# Patient Record
Sex: FEMALE | Race: WHITE | NOT HISPANIC OR LATINO | Employment: OTHER | URBAN - NONMETROPOLITAN AREA
[De-identification: names, ages, dates, MRNs, and addresses within clinical notes are randomized per-mention and may not be internally consistent; named-entity substitution may affect disease eponyms.]

---

## 2017-08-23 ENCOUNTER — OFFICE VISIT (OUTPATIENT)
Dept: GASTROENTEROLOGY | Facility: CLINIC | Age: 82
End: 2017-08-23

## 2017-08-23 VITALS
BODY MASS INDEX: 20.95 KG/M2 | HEART RATE: 84 BPM | HEIGHT: 64 IN | WEIGHT: 122.7 LBS | DIASTOLIC BLOOD PRESSURE: 69 MMHG | SYSTOLIC BLOOD PRESSURE: 126 MMHG

## 2017-08-23 DIAGNOSIS — D64.9 ANEMIA, UNSPECIFIED TYPE: Primary | ICD-10-CM

## 2017-08-23 DIAGNOSIS — R19.5 HEMATEST POSITIVE STOOLS: ICD-10-CM

## 2017-08-23 PROCEDURE — 99213 OFFICE O/P EST LOW 20 MIN: CPT | Performed by: NURSE PRACTITIONER

## 2017-08-23 RX ORDER — POTASSIUM CHLORIDE 20 MEQ/1
20 TABLET, EXTENDED RELEASE ORAL 2 TIMES DAILY
COMMUNITY
End: 2017-10-04 | Stop reason: ALTCHOICE

## 2017-08-23 RX ORDER — INSULIN GLARGINE 100 [IU]/ML
5 INJECTION, SOLUTION SUBCUTANEOUS NIGHTLY
COMMUNITY

## 2017-08-23 RX ORDER — FUROSEMIDE 40 MG/1
20 TABLET ORAL DAILY
COMMUNITY
Start: 2017-08-15 | End: 2017-12-22 | Stop reason: HOSPADM

## 2017-09-07 ENCOUNTER — APPOINTMENT (OUTPATIENT)
Dept: GENERAL RADIOLOGY | Age: 82
DRG: 871 | End: 2017-09-07
Payer: MEDICARE

## 2017-09-07 ENCOUNTER — APPOINTMENT (OUTPATIENT)
Dept: CT IMAGING | Age: 82
DRG: 871 | End: 2017-09-07
Payer: MEDICARE

## 2017-09-07 ENCOUNTER — HOSPITAL ENCOUNTER (INPATIENT)
Age: 82
LOS: 7 days | Discharge: HOME HEALTH | DRG: 871 | End: 2017-09-14
Attending: EMERGENCY MEDICINE | Admitting: FAMILY MEDICINE
Payer: MEDICARE

## 2017-09-07 DIAGNOSIS — R41.82 ALTERED MENTAL STATUS, UNSPECIFIED ALTERED MENTAL STATUS TYPE: Primary | ICD-10-CM

## 2017-09-07 DIAGNOSIS — I50.9 ACUTE ON CHRONIC CONGESTIVE HEART FAILURE, UNSPECIFIED CONGESTIVE HEART FAILURE TYPE: ICD-10-CM

## 2017-09-07 DIAGNOSIS — A41.9 SEPSIS, DUE TO UNSPECIFIED ORGANISM: ICD-10-CM

## 2017-09-07 DIAGNOSIS — E87.5 HYPERKALEMIA: ICD-10-CM

## 2017-09-07 DIAGNOSIS — J69.0 ASPIRATION PNEUMONIA OF RIGHT LUNG DUE TO GASTRIC SECRETIONS, UNSPECIFIED PART OF LUNG (HCC): ICD-10-CM

## 2017-09-07 DIAGNOSIS — R77.8 ELEVATED TROPONIN: ICD-10-CM

## 2017-09-07 DIAGNOSIS — I48.91 ATRIAL FIBRILLATION WITH RVR (HCC): ICD-10-CM

## 2017-09-07 DIAGNOSIS — N17.9 AKI (ACUTE KIDNEY INJURY) (HCC): ICD-10-CM

## 2017-09-07 PROBLEM — E11.9 TYPE 2 DIABETES MELLITUS WITHOUT COMPLICATION (HCC): Status: ACTIVE | Noted: 2017-09-07

## 2017-09-07 PROBLEM — I50.43 ACUTE ON CHRONIC COMBINED SYSTOLIC AND DIASTOLIC HEART FAILURE (HCC): Status: ACTIVE | Noted: 2017-09-07

## 2017-09-07 PROBLEM — G30.1 LATE ONSET ALZHEIMER'S DISEASE WITHOUT BEHAVIORAL DISTURBANCE (HCC): Status: ACTIVE | Noted: 2017-09-07

## 2017-09-07 PROBLEM — F02.80 LATE ONSET ALZHEIMER'S DISEASE WITHOUT BEHAVIORAL DISTURBANCE (HCC): Status: ACTIVE | Noted: 2017-09-07

## 2017-09-07 PROBLEM — I21.02 ST ELEVATION MYOCARDIAL INFARCTION INVOLVING LEFT ANTERIOR DESCENDING (LAD) CORONARY ARTERY (HCC): Status: ACTIVE | Noted: 2017-09-07

## 2017-09-07 LAB
ALBUMIN SERPL-MCNC: 4 G/DL (ref 3.5–5.2)
ALP BLD-CCNC: 67 U/L (ref 35–104)
ALT SERPL-CCNC: 52 U/L (ref 5–33)
ANION GAP SERPL CALCULATED.3IONS-SCNC: 21 MMOL/L (ref 7–19)
ANION GAP SERPL CALCULATED.3IONS-SCNC: 21 MMOL/L (ref 7–19)
APTT: 73.5 SEC (ref 26–36.2)
AST SERPL-CCNC: 83 U/L (ref 5–32)
BACTERIA: NEGATIVE /HPF
BASE EXCESS ARTERIAL: 0.7 MMOL/L (ref -2–2)
BASOPHILS ABSOLUTE: 0 K/UL (ref 0–0.2)
BASOPHILS RELATIVE PERCENT: 0.2 % (ref 0–1)
BILIRUB SERPL-MCNC: 0.8 MG/DL (ref 0.2–1.2)
BILIRUBIN URINE: NEGATIVE
BLOOD, URINE: NEGATIVE
BUN BLDV-MCNC: 49 MG/DL (ref 8–23)
BUN BLDV-MCNC: 49 MG/DL (ref 8–23)
CALCIUM SERPL-MCNC: 10 MG/DL (ref 8.2–9.6)
CALCIUM SERPL-MCNC: 9.4 MG/DL (ref 8.2–9.6)
CARBOXYHEMOGLOBIN ARTERIAL: 2.1 % (ref 0–5)
CHLORIDE BLD-SCNC: 94 MMOL/L (ref 98–111)
CHLORIDE BLD-SCNC: 97 MMOL/L (ref 98–111)
CLARITY: CLEAR
CO2: 24 MMOL/L (ref 22–29)
CO2: 24 MMOL/L (ref 22–29)
COLOR: YELLOW
CREAT SERPL-MCNC: 1.4 MG/DL (ref 0.5–0.9)
CREAT SERPL-MCNC: 1.4 MG/DL (ref 0.5–0.9)
DIGOXIN LEVEL: 0.8 NG/ML (ref 0.6–1.2)
EOSINOPHILS ABSOLUTE: 0 K/UL (ref 0–0.6)
EOSINOPHILS RELATIVE PERCENT: 0 % (ref 0–5)
EPITHELIAL CELLS, UA: 0 /HPF (ref 0–5)
GFR NON-AFRICAN AMERICAN: 35
GFR NON-AFRICAN AMERICAN: 35
GLUCOSE BLD-MCNC: 222 MG/DL (ref 70–99)
GLUCOSE BLD-MCNC: 228 MG/DL (ref 70–99)
GLUCOSE BLD-MCNC: 242 MG/DL (ref 74–109)
GLUCOSE BLD-MCNC: 290 MG/DL
GLUCOSE BLD-MCNC: 290 MG/DL (ref 70–99)
GLUCOSE BLD-MCNC: 304 MG/DL (ref 74–109)
GLUCOSE URINE: NEGATIVE MG/DL
HCO3 ARTERIAL: 23.4 MMOL/L (ref 22–26)
HCT VFR BLD CALC: 34.7 % (ref 37–47)
HEMOGLOBIN, ART, EXTENDED: 9.8 G/DL (ref 12–16)
HEMOGLOBIN: 10.1 G/DL (ref 12–16)
HYALINE CASTS: 2 /HPF (ref 0–8)
INR BLD: 3.01 (ref 0.88–1.18)
KETONES, URINE: NEGATIVE MG/DL
LACTIC ACID: 4.2 MG/DL (ref 0.5–1.9)
LACTIC ACID: 7.7 MG/DL (ref 0.5–1.9)
LEUKOCYTE ESTERASE, URINE: NEGATIVE
LYMPHOCYTES ABSOLUTE: 1 K/UL (ref 1.1–4.5)
LYMPHOCYTES RELATIVE PERCENT: 9 % (ref 20–40)
MCH RBC QN AUTO: 24.5 PG (ref 27–31)
MCHC RBC AUTO-ENTMCNC: 29.1 G/DL (ref 33–37)
MCV RBC AUTO: 84 FL (ref 81–99)
METHEMOGLOBIN ARTERIAL: 1 %
MONOCYTES ABSOLUTE: 0.4 K/UL (ref 0–0.9)
MONOCYTES RELATIVE PERCENT: 3.6 % (ref 0–10)
NEUTROPHILS ABSOLUTE: 10 K/UL (ref 1.5–7.5)
NEUTROPHILS RELATIVE PERCENT: 86.4 % (ref 50–65)
NITRITE, URINE: NEGATIVE
O2 CONTENT ARTERIAL: 12.7 ML/DL
O2 SAT, ARTERIAL: 91.6 %
O2 THERAPY: ABNORMAL
PCO2 ARTERIAL: 30 MMHG (ref 35–45)
PDW BLD-RTO: 18 % (ref 11.5–14.5)
PERFORMED ON: ABNORMAL
PH ARTERIAL: 7.5 (ref 7.35–7.45)
PH UA: 5.5
PLATELET # BLD: 275 K/UL (ref 130–400)
PMV BLD AUTO: 12.5 FL (ref 9.4–12.3)
PO2 ARTERIAL: 57 MMHG (ref 80–100)
POC TROPONIN I: 0.15 NG/ML (ref 0–0.08)
POC TROPONIN I: 0.34 NG/ML (ref 0–0.08)
POTASSIUM SERPL-SCNC: 4.2 MMOL/L (ref 3.5–5)
POTASSIUM SERPL-SCNC: 5.9 MMOL/L (ref 3.5–5)
POTASSIUM, WHOLE BLOOD: 4.4
PRO-BNP: ABNORMAL PG/ML (ref 0–1800)
PROTEIN UA: 30 MG/DL
PROTHROMBIN TIME: 31.6 SEC (ref 12–14.6)
RBC # BLD: 4.13 M/UL (ref 4.2–5.4)
RBC UA: 4 /HPF (ref 0–4)
SODIUM BLD-SCNC: 139 MMOL/L (ref 136–145)
SODIUM BLD-SCNC: 142 MMOL/L (ref 136–145)
SPECIFIC GRAVITY UA: 1.02
TOTAL PROTEIN: 7.9 G/DL (ref 6.6–8.7)
TROPONIN: 0.2 NG/ML (ref 0–0.03)
TSH SERPL DL<=0.05 MIU/L-ACNC: 3.53 UIU/ML (ref 0.27–4.2)
UROBILINOGEN, URINE: 1 E.U./DL
WBC # BLD: 11.5 K/UL (ref 4.8–10.8)
WBC UA: 1 /HPF (ref 0–5)

## 2017-09-07 PROCEDURE — 85730 THROMBOPLASTIN TIME PARTIAL: CPT

## 2017-09-07 PROCEDURE — 2580000003 HC RX 258: Performed by: FAMILY MEDICINE

## 2017-09-07 PROCEDURE — 36556 INSERT NON-TUNNEL CV CATH: CPT | Performed by: FAMILY MEDICINE

## 2017-09-07 PROCEDURE — 6360000002 HC RX W HCPCS: Performed by: EMERGENCY MEDICINE

## 2017-09-07 PROCEDURE — 6370000000 HC RX 637 (ALT 250 FOR IP): Performed by: EMERGENCY MEDICINE

## 2017-09-07 PROCEDURE — 93005 ELECTROCARDIOGRAM TRACING: CPT

## 2017-09-07 PROCEDURE — 94640 AIRWAY INHALATION TREATMENT: CPT

## 2017-09-07 PROCEDURE — 72125 CT NECK SPINE W/O DYE: CPT

## 2017-09-07 PROCEDURE — 83605 ASSAY OF LACTIC ACID: CPT

## 2017-09-07 PROCEDURE — 80053 COMPREHEN METABOLIC PANEL: CPT

## 2017-09-07 PROCEDURE — 84132 ASSAY OF SERUM POTASSIUM: CPT

## 2017-09-07 PROCEDURE — 80162 ASSAY OF DIGOXIN TOTAL: CPT

## 2017-09-07 PROCEDURE — 84443 ASSAY THYROID STIM HORMONE: CPT

## 2017-09-07 PROCEDURE — 71010 XR CHEST PORTABLE: CPT

## 2017-09-07 PROCEDURE — 74176 CT ABD & PELVIS W/O CONTRAST: CPT

## 2017-09-07 PROCEDURE — 2500000003 HC RX 250 WO HCPCS: Performed by: EMERGENCY MEDICINE

## 2017-09-07 PROCEDURE — 99291 CRITICAL CARE FIRST HOUR: CPT | Performed by: FAMILY MEDICINE

## 2017-09-07 PROCEDURE — 82948 REAGENT STRIP/BLOOD GLUCOSE: CPT

## 2017-09-07 PROCEDURE — 70450 CT HEAD/BRAIN W/O DYE: CPT

## 2017-09-07 PROCEDURE — 6370000000 HC RX 637 (ALT 250 FOR IP): Performed by: HOSPITALIST

## 2017-09-07 PROCEDURE — 99291 CRITICAL CARE FIRST HOUR: CPT | Performed by: EMERGENCY MEDICINE

## 2017-09-07 PROCEDURE — 36600 WITHDRAWAL OF ARTERIAL BLOOD: CPT

## 2017-09-07 PROCEDURE — 84484 ASSAY OF TROPONIN QUANT: CPT

## 2017-09-07 PROCEDURE — 85025 COMPLETE CBC W/AUTO DIFF WBC: CPT

## 2017-09-07 PROCEDURE — 82803 BLOOD GASES ANY COMBINATION: CPT

## 2017-09-07 PROCEDURE — 6370000000 HC RX 637 (ALT 250 FOR IP): Performed by: FAMILY MEDICINE

## 2017-09-07 PROCEDURE — 2000000000 HC ICU R&B

## 2017-09-07 PROCEDURE — 87040 BLOOD CULTURE FOR BACTERIA: CPT

## 2017-09-07 PROCEDURE — 06HM33Z INSERTION OF INFUSION DEVICE INTO RIGHT FEMORAL VEIN, PERCUTANEOUS APPROACH: ICD-10-PCS | Performed by: FAMILY MEDICINE

## 2017-09-07 PROCEDURE — 83880 ASSAY OF NATRIURETIC PEPTIDE: CPT

## 2017-09-07 PROCEDURE — 81001 URINALYSIS AUTO W/SCOPE: CPT

## 2017-09-07 PROCEDURE — 2580000003 HC RX 258: Performed by: EMERGENCY MEDICINE

## 2017-09-07 PROCEDURE — 2700000000 HC OXYGEN THERAPY PER DAY

## 2017-09-07 PROCEDURE — 36415 COLL VENOUS BLD VENIPUNCTURE: CPT

## 2017-09-07 PROCEDURE — 36556 INSERT NON-TUNNEL CV CATH: CPT

## 2017-09-07 PROCEDURE — 99285 EMERGENCY DEPT VISIT HI MDM: CPT

## 2017-09-07 PROCEDURE — 85610 PROTHROMBIN TIME: CPT

## 2017-09-07 RX ORDER — DIGOXIN 125 MCG
125 TABLET ORAL EVERY OTHER DAY
COMMUNITY
End: 2018-01-05

## 2017-09-07 RX ORDER — MORPHINE SULFATE 4 MG/ML
1 INJECTION, SOLUTION INTRAMUSCULAR; INTRAVENOUS
Status: DISCONTINUED | OUTPATIENT
Start: 2017-09-07 | End: 2017-09-14 | Stop reason: HOSPADM

## 2017-09-07 RX ORDER — DEXTROSE MONOHYDRATE 50 MG/ML
100 INJECTION, SOLUTION INTRAVENOUS PRN
Status: DISCONTINUED | OUTPATIENT
Start: 2017-09-07 | End: 2017-09-14 | Stop reason: HOSPADM

## 2017-09-07 RX ORDER — ASPIRIN 300 MG/1
300 SUPPOSITORY RECTAL DAILY
Status: DISCONTINUED | OUTPATIENT
Start: 2017-09-07 | End: 2017-09-08

## 2017-09-07 RX ORDER — LISINOPRIL 10 MG/1
10 TABLET ORAL DAILY
COMMUNITY
End: 2018-01-05

## 2017-09-07 RX ORDER — DEXTROSE MONOHYDRATE 25 G/50ML
12.5 INJECTION, SOLUTION INTRAVENOUS PRN
Status: DISCONTINUED | OUTPATIENT
Start: 2017-09-07 | End: 2017-09-14 | Stop reason: HOSPADM

## 2017-09-07 RX ORDER — FUROSEMIDE 40 MG/1
40 TABLET ORAL EVERY OTHER DAY
Status: ON HOLD | COMMUNITY
End: 2017-09-14

## 2017-09-07 RX ORDER — SODIUM CHLORIDE 0.9 % (FLUSH) 0.9 %
10 SYRINGE (ML) INJECTION EVERY 12 HOURS SCHEDULED
Status: DISCONTINUED | OUTPATIENT
Start: 2017-09-07 | End: 2017-09-14 | Stop reason: HOSPADM

## 2017-09-07 RX ORDER — INSULIN GLARGINE 100 [IU]/ML
INJECTION, SOLUTION SUBCUTANEOUS NIGHTLY
Status: ON HOLD | COMMUNITY
End: 2017-09-14

## 2017-09-07 RX ORDER — SODIUM CHLORIDE 9 MG/ML
INJECTION, SOLUTION INTRAVENOUS CONTINUOUS
Status: DISCONTINUED | OUTPATIENT
Start: 2017-09-07 | End: 2017-09-12

## 2017-09-07 RX ORDER — PANTOPRAZOLE SODIUM 40 MG/1
40 GRANULE, DELAYED RELEASE ORAL
COMMUNITY

## 2017-09-07 RX ORDER — ACETAMINOPHEN 325 MG/1
650 TABLET ORAL EVERY 4 HOURS PRN
Status: DISCONTINUED | OUTPATIENT
Start: 2017-09-07 | End: 2017-09-14 | Stop reason: HOSPADM

## 2017-09-07 RX ORDER — VANCOMYCIN HYDROCHLORIDE 1 G/200ML
1000 INJECTION, SOLUTION INTRAVENOUS ONCE
Status: COMPLETED | OUTPATIENT
Start: 2017-09-07 | End: 2017-09-07

## 2017-09-07 RX ORDER — LEVOFLOXACIN 5 MG/ML
250 INJECTION, SOLUTION INTRAVENOUS EVERY 24 HOURS
Status: DISCONTINUED | OUTPATIENT
Start: 2017-09-08 | End: 2017-09-10

## 2017-09-07 RX ORDER — FUROSEMIDE 10 MG/ML
40 INJECTION INTRAMUSCULAR; INTRAVENOUS ONCE
Status: COMPLETED | OUTPATIENT
Start: 2017-09-07 | End: 2017-09-07

## 2017-09-07 RX ORDER — ATORVASTATIN CALCIUM 40 MG/1
40 TABLET, FILM COATED ORAL DAILY
COMMUNITY

## 2017-09-07 RX ORDER — IPRATROPIUM BROMIDE AND ALBUTEROL SULFATE 2.5; .5 MG/3ML; MG/3ML
1 SOLUTION RESPIRATORY (INHALATION) ONCE
Status: COMPLETED | OUTPATIENT
Start: 2017-09-07 | End: 2017-09-07

## 2017-09-07 RX ORDER — SODIUM POLYSTYRENE SULFONATE 15 G/60ML
15 SUSPENSION ORAL; RECTAL ONCE
Status: COMPLETED | OUTPATIENT
Start: 2017-09-07 | End: 2017-09-07

## 2017-09-07 RX ORDER — LEVOTHYROXINE SODIUM 0.03 MG/1
25 TABLET ORAL 2 TIMES DAILY
COMMUNITY

## 2017-09-07 RX ORDER — MORPHINE SULFATE 4 MG/ML
1 INJECTION, SOLUTION INTRAMUSCULAR; INTRAVENOUS
Status: DISCONTINUED | OUTPATIENT
Start: 2017-09-07 | End: 2017-09-07 | Stop reason: SDUPTHER

## 2017-09-07 RX ORDER — SODIUM CHLORIDE 0.9 % (FLUSH) 0.9 %
10 SYRINGE (ML) INJECTION PRN
Status: DISCONTINUED | OUTPATIENT
Start: 2017-09-07 | End: 2017-09-14 | Stop reason: HOSPADM

## 2017-09-07 RX ORDER — NICOTINE POLACRILEX 4 MG
15 LOZENGE BUCCAL PRN
Status: DISCONTINUED | OUTPATIENT
Start: 2017-09-07 | End: 2017-09-14 | Stop reason: HOSPADM

## 2017-09-07 RX ORDER — ONDANSETRON 2 MG/ML
4 INJECTION INTRAMUSCULAR; INTRAVENOUS EVERY 6 HOURS PRN
Status: DISCONTINUED | OUTPATIENT
Start: 2017-09-07 | End: 2017-09-14 | Stop reason: HOSPADM

## 2017-09-07 RX ORDER — CARVEDILOL 12.5 MG/1
12.5 TABLET ORAL 2 TIMES DAILY
Status: ON HOLD | COMMUNITY
End: 2017-09-14

## 2017-09-07 RX ORDER — LEVOFLOXACIN 5 MG/ML
750 INJECTION, SOLUTION INTRAVENOUS ONCE
Status: COMPLETED | OUTPATIENT
Start: 2017-09-07 | End: 2017-09-07

## 2017-09-07 RX ORDER — IPRATROPIUM BROMIDE AND ALBUTEROL SULFATE 2.5; .5 MG/3ML; MG/3ML
1 SOLUTION RESPIRATORY (INHALATION) EVERY 4 HOURS
Status: DISCONTINUED | OUTPATIENT
Start: 2017-09-07 | End: 2017-09-14 | Stop reason: HOSPADM

## 2017-09-07 RX ORDER — MORPHINE SULFATE 4 MG/ML
2 INJECTION, SOLUTION INTRAMUSCULAR; INTRAVENOUS
Status: DISCONTINUED | OUTPATIENT
Start: 2017-09-07 | End: 2017-09-07 | Stop reason: SDUPTHER

## 2017-09-07 RX ORDER — POTASSIUM CHLORIDE 1.5 G/1.77G
20 POWDER, FOR SOLUTION ORAL DAILY
Status: ON HOLD | COMMUNITY
End: 2017-09-14 | Stop reason: HOSPADM

## 2017-09-07 RX ORDER — SODIUM CHLORIDE 9 MG/ML
INJECTION, SOLUTION INTRAVENOUS CONTINUOUS
Status: DISCONTINUED | OUTPATIENT
Start: 2017-09-07 | End: 2017-09-07 | Stop reason: SDUPTHER

## 2017-09-07 RX ORDER — CALCIUM CHLORIDE 100 MG/ML
1 INJECTION INTRAVENOUS; INTRAVENTRICULAR ONCE
Status: COMPLETED | OUTPATIENT
Start: 2017-09-07 | End: 2017-09-07

## 2017-09-07 RX ORDER — INDAPAMIDE 1.25 MG/1
1.25 TABLET, FILM COATED ORAL DAILY
Status: ON HOLD | COMMUNITY
End: 2017-09-14 | Stop reason: HOSPADM

## 2017-09-07 RX ORDER — MORPHINE SULFATE 4 MG/ML
2 INJECTION, SOLUTION INTRAMUSCULAR; INTRAVENOUS
Status: DISCONTINUED | OUTPATIENT
Start: 2017-09-07 | End: 2017-09-14 | Stop reason: HOSPADM

## 2017-09-07 RX ORDER — DEXTROSE MONOHYDRATE 25 G/50ML
25 INJECTION, SOLUTION INTRAVENOUS ONCE
Status: COMPLETED | OUTPATIENT
Start: 2017-09-07 | End: 2017-09-07

## 2017-09-07 RX ORDER — DABIGATRAN ETEXILATE 75 MG/1
75 CAPSULE, COATED PELLETS ORAL 2 TIMES DAILY
COMMUNITY
End: 2018-01-05

## 2017-09-07 RX ORDER — INSULIN GLARGINE 100 [IU]/ML
5 INJECTION, SOLUTION SUBCUTANEOUS NIGHTLY
Status: DISCONTINUED | OUTPATIENT
Start: 2017-09-07 | End: 2017-09-14 | Stop reason: HOSPADM

## 2017-09-07 RX ADMIN — INSULIN LISPRO 2 UNITS: 100 INJECTION, SOLUTION INTRAVENOUS; SUBCUTANEOUS at 21:11

## 2017-09-07 RX ADMIN — INSULIN LISPRO 4 UNITS: 100 INJECTION, SOLUTION INTRAVENOUS; SUBCUTANEOUS at 18:12

## 2017-09-07 RX ADMIN — IPRATROPIUM BROMIDE AND ALBUTEROL SULFATE 1 AMPULE: .5; 3 SOLUTION RESPIRATORY (INHALATION) at 18:37

## 2017-09-07 RX ADMIN — INSULIN GLARGINE 5 UNITS: 100 INJECTION, SOLUTION SUBCUTANEOUS at 21:10

## 2017-09-07 RX ADMIN — ASPIRIN 300 MG: 300 SUPPOSITORY RECTAL at 17:38

## 2017-09-07 RX ADMIN — FUROSEMIDE 40 MG: 10 INJECTION, SOLUTION INTRAMUSCULAR; INTRAVENOUS at 14:11

## 2017-09-07 RX ADMIN — TAZOBACTAM SODIUM AND PIPERACILLIN SODIUM 3.38 G: 375; 3 INJECTION, SOLUTION INTRAVENOUS at 14:27

## 2017-09-07 RX ADMIN — IPRATROPIUM BROMIDE AND ALBUTEROL SULFATE 1 AMPULE: .5; 3 SOLUTION RESPIRATORY (INHALATION) at 22:24

## 2017-09-07 RX ADMIN — SODIUM POLYSTYRENE SULFONATE 15 G: 15 SUSPENSION ORAL; RECTAL at 17:56

## 2017-09-07 RX ADMIN — DEXTROSE MONOHYDRATE 25 G: 25 INJECTION, SOLUTION INTRAVENOUS at 13:43

## 2017-09-07 RX ADMIN — LEVOFLOXACIN 750 MG: 5 INJECTION, SOLUTION INTRAVENOUS at 14:48

## 2017-09-07 RX ADMIN — Medication 10 ML: at 21:07

## 2017-09-07 RX ADMIN — INSULIN HUMAN 10 UNITS: 100 INJECTION, SOLUTION PARENTERAL at 14:05

## 2017-09-07 RX ADMIN — VANCOMYCIN HYDROCHLORIDE 1000 MG: 1 INJECTION, SOLUTION INTRAVENOUS at 17:03

## 2017-09-07 RX ADMIN — IPRATROPIUM BROMIDE AND ALBUTEROL SULFATE 1 AMPULE: .5; 3 SOLUTION RESPIRATORY (INHALATION) at 13:26

## 2017-09-07 RX ADMIN — CALCIUM CHLORIDE 1 G: 100 INJECTION, SOLUTION INTRAVENOUS at 14:04

## 2017-09-07 ASSESSMENT — PAIN SCALES - WONG BAKER
WONGBAKER_NUMERICALRESPONSE: 0
WONGBAKER_NUMERICALRESPONSE: 0

## 2017-09-08 LAB
ANION GAP SERPL CALCULATED.3IONS-SCNC: 17 MMOL/L (ref 7–19)
ANION GAP SERPL CALCULATED.3IONS-SCNC: 19 MMOL/L (ref 7–19)
BASOPHILS ABSOLUTE: 0 K/UL (ref 0–0.2)
BASOPHILS RELATIVE PERCENT: 0.1 % (ref 0–1)
BUN BLDV-MCNC: 54 MG/DL (ref 8–23)
BUN BLDV-MCNC: 57 MG/DL (ref 8–23)
CALCIUM SERPL-MCNC: 9 MG/DL (ref 8.2–9.6)
CALCIUM SERPL-MCNC: 9.8 MG/DL (ref 8.2–9.6)
CHLORIDE BLD-SCNC: 96 MMOL/L (ref 98–111)
CHLORIDE BLD-SCNC: 98 MMOL/L (ref 98–111)
CO2: 26 MMOL/L (ref 22–29)
CO2: 29 MMOL/L (ref 22–29)
CREAT SERPL-MCNC: 1.5 MG/DL (ref 0.5–0.9)
CREAT SERPL-MCNC: 1.6 MG/DL (ref 0.5–0.9)
EKG P AXIS: NORMAL DEGREES
EKG P-R INTERVAL: NORMAL MS
EKG Q-T INTERVAL: 368 MS
EKG QRS DURATION: 130 MS
EKG QTC CALCULATION (BAZETT): 457 MS
EKG T AXIS: -140 DEGREES
EOSINOPHILS ABSOLUTE: 0 K/UL (ref 0–0.6)
EOSINOPHILS RELATIVE PERCENT: 0 % (ref 0–5)
GFR NON-AFRICAN AMERICAN: 30
GFR NON-AFRICAN AMERICAN: 32
GLUCOSE BLD-MCNC: 139 MG/DL (ref 70–99)
GLUCOSE BLD-MCNC: 155 MG/DL (ref 70–99)
GLUCOSE BLD-MCNC: 158 MG/DL (ref 74–109)
GLUCOSE BLD-MCNC: 162 MG/DL (ref 70–99)
GLUCOSE BLD-MCNC: 166 MG/DL (ref 74–109)
GLUCOSE BLD-MCNC: 184 MG/DL (ref 70–99)
HCT VFR BLD CALC: 28.5 % (ref 37–47)
HEMOGLOBIN: 8.4 G/DL (ref 12–16)
LACTIC ACID: 1.7 MG/DL (ref 0.5–1.9)
LACTIC ACID: 3.8 MG/DL (ref 0.5–1.9)
LV EF: 13 %
LVEF MODALITY: NORMAL
LYMPHOCYTES ABSOLUTE: 1.6 K/UL (ref 1.1–4.5)
LYMPHOCYTES RELATIVE PERCENT: 9 % (ref 20–40)
MAGNESIUM: 1 MG/DL (ref 1.7–2.3)
MCH RBC QN AUTO: 24.9 PG (ref 27–31)
MCHC RBC AUTO-ENTMCNC: 29.5 G/DL (ref 33–37)
MCV RBC AUTO: 84.3 FL (ref 81–99)
MONOCYTES ABSOLUTE: 1 K/UL (ref 0–0.9)
MONOCYTES RELATIVE PERCENT: 6 % (ref 0–10)
NEUTROPHILS ABSOLUTE: 14.7 K/UL (ref 1.5–7.5)
NEUTROPHILS RELATIVE PERCENT: 84.4 % (ref 50–65)
PDW BLD-RTO: 18 % (ref 11.5–14.5)
PERFORMED ON: ABNORMAL
PHOSPHORUS: 3.2 MG/DL (ref 2.5–4.5)
PLATELET # BLD: 248 K/UL (ref 130–400)
PMV BLD AUTO: 12.6 FL (ref 9.4–12.3)
POTASSIUM SERPL-SCNC: 2.9 MMOL/L (ref 3.5–5)
POTASSIUM SERPL-SCNC: 3.2 MMOL/L (ref 3.5–5)
POTASSIUM SERPL-SCNC: 4.3 MMOL/L (ref 3.5–5)
RBC # BLD: 3.38 M/UL (ref 4.2–5.4)
SODIUM BLD-SCNC: 141 MMOL/L (ref 136–145)
SODIUM BLD-SCNC: 144 MMOL/L (ref 136–145)
TROPONIN: 0.18 NG/ML (ref 0–0.03)
TROPONIN: 0.18 NG/ML (ref 0–0.03)
WBC # BLD: 17.4 K/UL (ref 4.8–10.8)

## 2017-09-08 PROCEDURE — 93005 ELECTROCARDIOGRAM TRACING: CPT

## 2017-09-08 PROCEDURE — 84100 ASSAY OF PHOSPHORUS: CPT

## 2017-09-08 PROCEDURE — 6360000002 HC RX W HCPCS: Performed by: FAMILY MEDICINE

## 2017-09-08 PROCEDURE — 2700000000 HC OXYGEN THERAPY PER DAY

## 2017-09-08 PROCEDURE — 2000000000 HC ICU R&B

## 2017-09-08 PROCEDURE — 83735 ASSAY OF MAGNESIUM: CPT

## 2017-09-08 PROCEDURE — 83540 ASSAY OF IRON: CPT

## 2017-09-08 PROCEDURE — 2500000003 HC RX 250 WO HCPCS: Performed by: FAMILY MEDICINE

## 2017-09-08 PROCEDURE — 84132 ASSAY OF SERUM POTASSIUM: CPT

## 2017-09-08 PROCEDURE — 82607 VITAMIN B-12: CPT

## 2017-09-08 PROCEDURE — 82728 ASSAY OF FERRITIN: CPT

## 2017-09-08 PROCEDURE — 6370000000 HC RX 637 (ALT 250 FOR IP): Performed by: FAMILY MEDICINE

## 2017-09-08 PROCEDURE — 80048 BASIC METABOLIC PNL TOTAL CA: CPT

## 2017-09-08 PROCEDURE — 83605 ASSAY OF LACTIC ACID: CPT

## 2017-09-08 PROCEDURE — 36415 COLL VENOUS BLD VENIPUNCTURE: CPT

## 2017-09-08 PROCEDURE — 83550 IRON BINDING TEST: CPT

## 2017-09-08 PROCEDURE — 2580000003 HC RX 258: Performed by: FAMILY MEDICINE

## 2017-09-08 PROCEDURE — 82746 ASSAY OF FOLIC ACID SERUM: CPT

## 2017-09-08 PROCEDURE — 85025 COMPLETE CBC W/AUTO DIFF WBC: CPT

## 2017-09-08 PROCEDURE — 93306 TTE W/DOPPLER COMPLETE: CPT

## 2017-09-08 PROCEDURE — 6370000000 HC RX 637 (ALT 250 FOR IP): Performed by: HOSPITALIST

## 2017-09-08 PROCEDURE — 94640 AIRWAY INHALATION TREATMENT: CPT

## 2017-09-08 PROCEDURE — 82948 REAGENT STRIP/BLOOD GLUCOSE: CPT

## 2017-09-08 PROCEDURE — 99233 SBSQ HOSP IP/OBS HIGH 50: CPT | Performed by: FAMILY MEDICINE

## 2017-09-08 PROCEDURE — 84484 ASSAY OF TROPONIN QUANT: CPT

## 2017-09-08 RX ORDER — METOPROLOL TARTRATE 5 MG/5ML
2.5 INJECTION INTRAVENOUS EVERY 6 HOURS
Status: DISCONTINUED | OUTPATIENT
Start: 2017-09-08 | End: 2017-09-10

## 2017-09-08 RX ORDER — ASPIRIN 81 MG/1
81 TABLET, CHEWABLE ORAL DAILY
Status: DISCONTINUED | OUTPATIENT
Start: 2017-09-08 | End: 2017-09-14 | Stop reason: HOSPADM

## 2017-09-08 RX ORDER — ATORVASTATIN CALCIUM 10 MG/1
10 TABLET, FILM COATED ORAL NIGHTLY
Status: DISCONTINUED | OUTPATIENT
Start: 2017-09-08 | End: 2017-09-10 | Stop reason: DRUGHIGH

## 2017-09-08 RX ORDER — POTASSIUM CHLORIDE 29.8 MG/ML
20 INJECTION INTRAVENOUS PRN
Status: DISCONTINUED | OUTPATIENT
Start: 2017-09-08 | End: 2017-09-12

## 2017-09-08 RX ORDER — 0.9 % SODIUM CHLORIDE 0.9 %
500 INTRAVENOUS SOLUTION INTRAVENOUS ONCE
Status: COMPLETED | OUTPATIENT
Start: 2017-09-08 | End: 2017-09-08

## 2017-09-08 RX ORDER — LEVOTHYROXINE SODIUM 0.03 MG/1
25 TABLET ORAL DAILY
Status: DISCONTINUED | OUTPATIENT
Start: 2017-09-08 | End: 2017-09-14 | Stop reason: HOSPADM

## 2017-09-08 RX ADMIN — Medication 10 ML: at 09:01

## 2017-09-08 RX ADMIN — MAGNESIUM SULFATE HEPTAHYDRATE 2 G: 500 INJECTION, SOLUTION INTRAMUSCULAR; INTRAVENOUS at 13:18

## 2017-09-08 RX ADMIN — IPRATROPIUM BROMIDE AND ALBUTEROL SULFATE 1 AMPULE: .5; 3 SOLUTION RESPIRATORY (INHALATION) at 06:17

## 2017-09-08 RX ADMIN — METOPROLOL TARTRATE 2.5 MG: 5 INJECTION INTRAVENOUS at 11:03

## 2017-09-08 RX ADMIN — AMIODARONE HYDROCHLORIDE 1 MG/MIN: 50 INJECTION, SOLUTION INTRAVENOUS at 11:50

## 2017-09-08 RX ADMIN — LEVOFLOXACIN 250 MG: 5 INJECTION, SOLUTION INTRAVENOUS at 15:27

## 2017-09-08 RX ADMIN — IPRATROPIUM BROMIDE AND ALBUTEROL SULFATE 1 AMPULE: .5; 3 SOLUTION RESPIRATORY (INHALATION) at 02:59

## 2017-09-08 RX ADMIN — LEVOTHYROXINE SODIUM 25 MCG: 25 TABLET ORAL at 11:40

## 2017-09-08 RX ADMIN — INSULIN LISPRO 2 UNITS: 100 INJECTION, SOLUTION INTRAVENOUS; SUBCUTANEOUS at 09:02

## 2017-09-08 RX ADMIN — ASPIRIN 300 MG: 300 SUPPOSITORY RECTAL at 09:01

## 2017-09-08 RX ADMIN — POTASSIUM CHLORIDE 20 MEQ: 29.8 INJECTION, SOLUTION INTRAVENOUS at 11:03

## 2017-09-08 RX ADMIN — POTASSIUM CHLORIDE 20 MEQ: 29.8 INJECTION, SOLUTION INTRAVENOUS at 12:09

## 2017-09-08 RX ADMIN — POTASSIUM CHLORIDE 20 MEQ: 29.8 INJECTION, SOLUTION INTRAVENOUS at 13:09

## 2017-09-08 RX ADMIN — INSULIN LISPRO 1 UNITS: 100 INJECTION, SOLUTION INTRAVENOUS; SUBCUTANEOUS at 20:18

## 2017-09-08 RX ADMIN — IPRATROPIUM BROMIDE AND ALBUTEROL SULFATE 1 AMPULE: .5; 3 SOLUTION RESPIRATORY (INHALATION) at 23:01

## 2017-09-08 RX ADMIN — IPRATROPIUM BROMIDE AND ALBUTEROL SULFATE 1 AMPULE: .5; 3 SOLUTION RESPIRATORY (INHALATION) at 18:26

## 2017-09-08 RX ADMIN — AMIODARONE HYDROCHLORIDE 150 MG: 50 INJECTION, SOLUTION INTRAVENOUS at 11:40

## 2017-09-08 RX ADMIN — IPRATROPIUM BROMIDE AND ALBUTEROL SULFATE 1 AMPULE: .5; 3 SOLUTION RESPIRATORY (INHALATION) at 10:53

## 2017-09-08 RX ADMIN — AMIODARONE HYDROCHLORIDE 0.5 MG/MIN: 50 INJECTION, SOLUTION INTRAVENOUS at 19:47

## 2017-09-08 RX ADMIN — IPRATROPIUM BROMIDE AND ALBUTEROL SULFATE 1 AMPULE: .5; 3 SOLUTION RESPIRATORY (INHALATION) at 14:30

## 2017-09-08 RX ADMIN — INSULIN LISPRO 2 UNITS: 100 INJECTION, SOLUTION INTRAVENOUS; SUBCUTANEOUS at 17:03

## 2017-09-08 RX ADMIN — INSULIN GLARGINE 5 UNITS: 100 INJECTION, SOLUTION SUBCUTANEOUS at 20:17

## 2017-09-08 RX ADMIN — SODIUM CHLORIDE 500 ML: 9 INJECTION, SOLUTION INTRAVENOUS at 19:16

## 2017-09-08 RX ADMIN — ENOXAPARIN SODIUM 30 MG: 100 INJECTION SUBCUTANEOUS at 19:46

## 2017-09-08 RX ADMIN — ATORVASTATIN CALCIUM 10 MG: 10 TABLET, FILM COATED ORAL at 19:47

## 2017-09-08 RX ADMIN — Medication 10 ML: at 19:49

## 2017-09-08 ASSESSMENT — PAIN SCALES - WONG BAKER
WONGBAKER_NUMERICALRESPONSE: 0

## 2017-09-08 ASSESSMENT — PAIN SCALES - GENERAL
PAINLEVEL_OUTOF10: 0

## 2017-09-09 PROBLEM — G93.40 ENCEPHALOPATHY: Status: ACTIVE | Noted: 2017-09-07

## 2017-09-09 LAB
ANION GAP SERPL CALCULATED.3IONS-SCNC: 15 MMOL/L (ref 7–19)
BASOPHILS ABSOLUTE: 0 K/UL (ref 0–0.2)
BASOPHILS RELATIVE PERCENT: 0.2 % (ref 0–1)
BLOOD CULTURE, ROUTINE: ABNORMAL
BUN BLDV-MCNC: 58 MG/DL (ref 8–23)
CALCIUM SERPL-MCNC: 8.2 MG/DL (ref 8.2–9.6)
CHLORIDE BLD-SCNC: 101 MMOL/L (ref 98–111)
CO2: 25 MMOL/L (ref 22–29)
CREAT SERPL-MCNC: 1.6 MG/DL (ref 0.5–0.9)
EOSINOPHILS ABSOLUTE: 0 K/UL (ref 0–0.6)
EOSINOPHILS RELATIVE PERCENT: 0.2 % (ref 0–5)
GFR NON-AFRICAN AMERICAN: 30
GLUCOSE BLD-MCNC: 139 MG/DL (ref 70–99)
GLUCOSE BLD-MCNC: 158 MG/DL (ref 74–109)
GLUCOSE BLD-MCNC: 166 MG/DL (ref 70–99)
GLUCOSE BLD-MCNC: 173 MG/DL (ref 70–99)
HCT VFR BLD CALC: 21.1 % (ref 37–47)
HEMOGLOBIN: 6.4 G/DL (ref 12–16)
LYMPHOCYTES ABSOLUTE: 1.5 K/UL (ref 1.1–4.5)
LYMPHOCYTES RELATIVE PERCENT: 11.6 % (ref 20–40)
MCH RBC QN AUTO: 25.2 PG (ref 27–31)
MCHC RBC AUTO-ENTMCNC: 30.3 G/DL (ref 33–37)
MCV RBC AUTO: 83.1 FL (ref 81–99)
MONOCYTES ABSOLUTE: 0.8 K/UL (ref 0–0.9)
MONOCYTES RELATIVE PERCENT: 6 % (ref 0–10)
NEUTROPHILS ABSOLUTE: 10.8 K/UL (ref 1.5–7.5)
NEUTROPHILS RELATIVE PERCENT: 81.4 % (ref 50–65)
ORGANISM: ABNORMAL
PDW BLD-RTO: 18.3 % (ref 11.5–14.5)
PERFORMED ON: ABNORMAL
PLATELET # BLD: 227 K/UL (ref 130–400)
PMV BLD AUTO: 12.8 FL (ref 9.4–12.3)
POTASSIUM SERPL-SCNC: 3.6 MMOL/L (ref 3.5–5)
RBC # BLD: 2.54 M/UL (ref 4.2–5.4)
SODIUM BLD-SCNC: 141 MMOL/L (ref 136–145)
TROPONIN: 0.11 NG/ML (ref 0–0.03)
WBC # BLD: 13.2 K/UL (ref 4.8–10.8)

## 2017-09-09 PROCEDURE — 6370000000 HC RX 637 (ALT 250 FOR IP): Performed by: HOSPITALIST

## 2017-09-09 PROCEDURE — 2500000003 HC RX 250 WO HCPCS: Performed by: FAMILY MEDICINE

## 2017-09-09 PROCEDURE — 6360000002 HC RX W HCPCS: Performed by: FAMILY MEDICINE

## 2017-09-09 PROCEDURE — 84484 ASSAY OF TROPONIN QUANT: CPT

## 2017-09-09 PROCEDURE — 6370000000 HC RX 637 (ALT 250 FOR IP): Performed by: FAMILY MEDICINE

## 2017-09-09 PROCEDURE — 2580000003 HC RX 258: Performed by: FAMILY MEDICINE

## 2017-09-09 PROCEDURE — 80048 BASIC METABOLIC PNL TOTAL CA: CPT

## 2017-09-09 PROCEDURE — 92610 EVALUATE SWALLOWING FUNCTION: CPT

## 2017-09-09 PROCEDURE — 99233 SBSQ HOSP IP/OBS HIGH 50: CPT | Performed by: FAMILY MEDICINE

## 2017-09-09 PROCEDURE — 82948 REAGENT STRIP/BLOOD GLUCOSE: CPT

## 2017-09-09 PROCEDURE — G8997 SWALLOW GOAL STATUS: HCPCS

## 2017-09-09 PROCEDURE — G8996 SWALLOW CURRENT STATUS: HCPCS

## 2017-09-09 PROCEDURE — 94640 AIRWAY INHALATION TREATMENT: CPT

## 2017-09-09 PROCEDURE — 85025 COMPLETE CBC W/AUTO DIFF WBC: CPT

## 2017-09-09 PROCEDURE — 2140000000 HC CCU INTERMEDIATE R&B

## 2017-09-09 RX ORDER — AMIODARONE HYDROCHLORIDE 200 MG/1
300 TABLET ORAL 2 TIMES DAILY
Status: DISCONTINUED | OUTPATIENT
Start: 2017-09-09 | End: 2017-09-10

## 2017-09-09 RX ADMIN — IPRATROPIUM BROMIDE AND ALBUTEROL SULFATE 1 AMPULE: .5; 3 SOLUTION RESPIRATORY (INHALATION) at 22:31

## 2017-09-09 RX ADMIN — INSULIN LISPRO 2 UNITS: 100 INJECTION, SOLUTION INTRAVENOUS; SUBCUTANEOUS at 08:58

## 2017-09-09 RX ADMIN — ENOXAPARIN SODIUM 30 MG: 100 INJECTION SUBCUTANEOUS at 20:41

## 2017-09-09 RX ADMIN — LEVOFLOXACIN 250 MG: 5 INJECTION, SOLUTION INTRAVENOUS at 15:26

## 2017-09-09 RX ADMIN — Medication 10 ML: at 08:55

## 2017-09-09 RX ADMIN — ASPIRIN 81 MG CHEWABLE TABLET 81 MG: 81 TABLET CHEWABLE at 10:01

## 2017-09-09 RX ADMIN — AMIODARONE HYDROCHLORIDE 0.5 MG/MIN: 50 INJECTION, SOLUTION INTRAVENOUS at 13:06

## 2017-09-09 RX ADMIN — INSULIN LISPRO 2 UNITS: 100 INJECTION, SOLUTION INTRAVENOUS; SUBCUTANEOUS at 18:24

## 2017-09-09 RX ADMIN — Medication 10 ML: at 21:41

## 2017-09-09 RX ADMIN — ENOXAPARIN SODIUM 30 MG: 100 INJECTION SUBCUTANEOUS at 10:01

## 2017-09-09 RX ADMIN — IPRATROPIUM BROMIDE AND ALBUTEROL SULFATE 1 AMPULE: .5; 3 SOLUTION RESPIRATORY (INHALATION) at 19:04

## 2017-09-09 RX ADMIN — METOPROLOL TARTRATE 2.5 MG: 5 INJECTION INTRAVENOUS at 18:35

## 2017-09-09 RX ADMIN — LEVOTHYROXINE SODIUM 25 MCG: 25 TABLET ORAL at 06:39

## 2017-09-09 RX ADMIN — IPRATROPIUM BROMIDE AND ALBUTEROL SULFATE 1 AMPULE: .5; 3 SOLUTION RESPIRATORY (INHALATION) at 06:43

## 2017-09-09 RX ADMIN — IPRATROPIUM BROMIDE AND ALBUTEROL SULFATE 1 AMPULE: .5; 3 SOLUTION RESPIRATORY (INHALATION) at 14:49

## 2017-09-09 RX ADMIN — IPRATROPIUM BROMIDE AND ALBUTEROL SULFATE 1 AMPULE: .5; 3 SOLUTION RESPIRATORY (INHALATION) at 10:41

## 2017-09-09 RX ADMIN — AMIODARONE HYDROCHLORIDE 300 MG: 200 TABLET ORAL at 21:40

## 2017-09-09 RX ADMIN — AMIODARONE HYDROCHLORIDE 300 MG: 200 TABLET ORAL at 15:18

## 2017-09-09 RX ADMIN — ATORVASTATIN CALCIUM 10 MG: 10 TABLET, FILM COATED ORAL at 21:41

## 2017-09-09 ASSESSMENT — PAIN SCALES - GENERAL
PAINLEVEL_OUTOF10: 0

## 2017-09-09 ASSESSMENT — PAIN SCALES - WONG BAKER
WONGBAKER_NUMERICALRESPONSE: 0

## 2017-09-10 LAB
ABO/RH: NORMAL
ANION GAP SERPL CALCULATED.3IONS-SCNC: 19 MMOL/L (ref 7–19)
ANTIBODY SCREEN: NORMAL
APTT: 58.2 SEC (ref 26–36.2)
BASOPHILS ABSOLUTE: 0 K/UL (ref 0–0.2)
BASOPHILS RELATIVE PERCENT: 0.1 % (ref 0–1)
BLOOD BANK DISPENSE STATUS: NORMAL
BLOOD BANK PRODUCT CODE: NORMAL
BPU ID: NORMAL
BUN BLDV-MCNC: 52 MG/DL (ref 8–23)
CALCIUM SERPL-MCNC: 8.3 MG/DL (ref 8.2–9.6)
CHLORIDE BLD-SCNC: 101 MMOL/L (ref 98–111)
CO2: 22 MMOL/L (ref 22–29)
CREAT SERPL-MCNC: 1.7 MG/DL (ref 0.5–0.9)
DESCRIPTION BLOOD BANK: NORMAL
EOSINOPHILS ABSOLUTE: 0 K/UL (ref 0–0.6)
EOSINOPHILS RELATIVE PERCENT: 0.1 % (ref 0–5)
FERRITIN: 72.7 NG/ML (ref 13–150)
FOLATE: 9.5 NG/ML (ref 4.8–37.3)
GFR NON-AFRICAN AMERICAN: 28
GLUCOSE BLD-MCNC: 158 MG/DL (ref 74–109)
GLUCOSE BLD-MCNC: 223 MG/DL (ref 70–99)
GLUCOSE BLD-MCNC: 255 MG/DL (ref 70–99)
GLUCOSE BLD-MCNC: 262 MG/DL (ref 70–99)
HCT VFR BLD CALC: 19.9 % (ref 37–47)
HCT VFR BLD CALC: 25.6 % (ref 37–47)
HEMOGLOBIN: 6.1 G/DL (ref 12–16)
HEMOGLOBIN: 8.2 G/DL (ref 12–16)
INR BLD: 1.76 (ref 0.88–1.18)
IRON SATURATION: 8 % (ref 14–50)
IRON: 24 UG/DL (ref 37–145)
LYMPHOCYTES ABSOLUTE: 1.4 K/UL (ref 1.1–4.5)
LYMPHOCYTES RELATIVE PERCENT: 12.2 % (ref 20–40)
MCH RBC QN AUTO: 25.3 PG (ref 27–31)
MCH RBC QN AUTO: 27.5 PG (ref 27–31)
MCHC RBC AUTO-ENTMCNC: 30.7 G/DL (ref 33–37)
MCHC RBC AUTO-ENTMCNC: 32 G/DL (ref 33–37)
MCV RBC AUTO: 82.6 FL (ref 81–99)
MCV RBC AUTO: 85.9 FL (ref 81–99)
MONOCYTES ABSOLUTE: 0.8 K/UL (ref 0–0.9)
MONOCYTES RELATIVE PERCENT: 6.9 % (ref 0–10)
NEUTROPHILS ABSOLUTE: 9.2 K/UL (ref 1.5–7.5)
NEUTROPHILS RELATIVE PERCENT: 80.1 % (ref 50–65)
PDW BLD-RTO: 18.4 % (ref 11.5–14.5)
PDW BLD-RTO: 18.5 % (ref 11.5–14.5)
PERFORMED ON: ABNORMAL
PLATELET # BLD: 210 K/UL (ref 130–400)
PLATELET # BLD: 221 K/UL (ref 130–400)
PMV BLD AUTO: 12.3 FL (ref 9.4–12.3)
PMV BLD AUTO: 12.4 FL (ref 9.4–12.3)
POTASSIUM SERPL-SCNC: 3.3 MMOL/L (ref 3.5–5)
PROTHROMBIN TIME: 20.6 SEC (ref 12–14.6)
RBC # BLD: 2.41 M/UL (ref 4.2–5.4)
RBC # BLD: 2.98 M/UL (ref 4.2–5.4)
SODIUM BLD-SCNC: 142 MMOL/L (ref 136–145)
TOTAL IRON BINDING CAPACITY: 309 UG/DL (ref 250–400)
VITAMIN B-12: 259 PG/ML (ref 211–946)
WBC # BLD: 10.6 K/UL (ref 4.8–10.8)
WBC # BLD: 11.5 K/UL (ref 4.8–10.8)

## 2017-09-10 PROCEDURE — P9016 RBC LEUKOCYTES REDUCED: HCPCS

## 2017-09-10 PROCEDURE — 2500000003 HC RX 250 WO HCPCS: Performed by: FAMILY MEDICINE

## 2017-09-10 PROCEDURE — 82948 REAGENT STRIP/BLOOD GLUCOSE: CPT

## 2017-09-10 PROCEDURE — 85730 THROMBOPLASTIN TIME PARTIAL: CPT

## 2017-09-10 PROCEDURE — 6370000000 HC RX 637 (ALT 250 FOR IP): Performed by: PHYSICIAN ASSISTANT

## 2017-09-10 PROCEDURE — 2580000003 HC RX 258: Performed by: HOSPITALIST

## 2017-09-10 PROCEDURE — 80048 BASIC METABOLIC PNL TOTAL CA: CPT

## 2017-09-10 PROCEDURE — 99233 SBSQ HOSP IP/OBS HIGH 50: CPT | Performed by: HOSPITALIST

## 2017-09-10 PROCEDURE — 99223 1ST HOSP IP/OBS HIGH 75: CPT | Performed by: INTERNAL MEDICINE

## 2017-09-10 PROCEDURE — 2140000000 HC CCU INTERMEDIATE R&B

## 2017-09-10 PROCEDURE — 86900 BLOOD TYPING SEROLOGIC ABO: CPT

## 2017-09-10 PROCEDURE — 6370000000 HC RX 637 (ALT 250 FOR IP): Performed by: HOSPITALIST

## 2017-09-10 PROCEDURE — 2700000000 HC OXYGEN THERAPY PER DAY

## 2017-09-10 PROCEDURE — 2580000003 HC RX 258: Performed by: FAMILY MEDICINE

## 2017-09-10 PROCEDURE — 94640 AIRWAY INHALATION TREATMENT: CPT

## 2017-09-10 PROCEDURE — 36430 TRANSFUSION BLD/BLD COMPNT: CPT

## 2017-09-10 PROCEDURE — C9113 INJ PANTOPRAZOLE SODIUM, VIA: HCPCS | Performed by: HOSPITALIST

## 2017-09-10 PROCEDURE — 6360000002 HC RX W HCPCS: Performed by: HOSPITALIST

## 2017-09-10 PROCEDURE — 86850 RBC ANTIBODY SCREEN: CPT

## 2017-09-10 PROCEDURE — 6370000000 HC RX 637 (ALT 250 FOR IP): Performed by: INTERNAL MEDICINE

## 2017-09-10 PROCEDURE — 85027 COMPLETE CBC AUTOMATED: CPT

## 2017-09-10 PROCEDURE — 85610 PROTHROMBIN TIME: CPT

## 2017-09-10 PROCEDURE — 6370000000 HC RX 637 (ALT 250 FOR IP): Performed by: FAMILY MEDICINE

## 2017-09-10 PROCEDURE — 86901 BLOOD TYPING SEROLOGIC RH(D): CPT

## 2017-09-10 PROCEDURE — 85025 COMPLETE CBC W/AUTO DIFF WBC: CPT

## 2017-09-10 PROCEDURE — 87040 BLOOD CULTURE FOR BACTERIA: CPT

## 2017-09-10 RX ORDER — 0.9 % SODIUM CHLORIDE 0.9 %
250 INTRAVENOUS SOLUTION INTRAVENOUS ONCE
Status: DISCONTINUED | OUTPATIENT
Start: 2017-09-10 | End: 2017-09-14 | Stop reason: HOSPADM

## 2017-09-10 RX ORDER — ATORVASTATIN CALCIUM 40 MG/1
40 TABLET, FILM COATED ORAL DAILY
Status: DISCONTINUED | OUTPATIENT
Start: 2017-09-10 | End: 2017-09-14 | Stop reason: HOSPADM

## 2017-09-10 RX ORDER — PANTOPRAZOLE SODIUM 40 MG/10ML
40 INJECTION, POWDER, LYOPHILIZED, FOR SOLUTION INTRAVENOUS ONCE
Status: COMPLETED | OUTPATIENT
Start: 2017-09-10 | End: 2017-09-10

## 2017-09-10 RX ORDER — PANTOPRAZOLE SODIUM 40 MG/1
40 TABLET, DELAYED RELEASE ORAL
Status: DISCONTINUED | OUTPATIENT
Start: 2017-09-10 | End: 2017-09-14 | Stop reason: HOSPADM

## 2017-09-10 RX ORDER — POTASSIUM BICARBONATE 25 MEQ/1
25 TABLET, EFFERVESCENT ORAL ONCE
Status: COMPLETED | OUTPATIENT
Start: 2017-09-10 | End: 2017-09-10

## 2017-09-10 RX ORDER — DIGOXIN 125 MCG
125 TABLET ORAL EVERY OTHER DAY
Status: DISCONTINUED | OUTPATIENT
Start: 2017-09-10 | End: 2017-09-14 | Stop reason: HOSPADM

## 2017-09-10 RX ORDER — CARVEDILOL 6.25 MG/1
6.25 TABLET ORAL 2 TIMES DAILY
Status: DISCONTINUED | OUTPATIENT
Start: 2017-09-10 | End: 2017-09-14 | Stop reason: HOSPADM

## 2017-09-10 RX ADMIN — DIGOXIN 125 MCG: 125 TABLET ORAL at 08:58

## 2017-09-10 RX ADMIN — IPRATROPIUM BROMIDE AND ALBUTEROL SULFATE 1 AMPULE: .5; 3 SOLUTION RESPIRATORY (INHALATION) at 06:40

## 2017-09-10 RX ADMIN — PANTOPRAZOLE SODIUM 40 MG: 40 INJECTION, POWDER, FOR SOLUTION INTRAVENOUS at 14:40

## 2017-09-10 RX ADMIN — POTASSIUM BICARBONATE 25 MEQ: 25 TABLET, EFFERVESCENT ORAL at 14:40

## 2017-09-10 RX ADMIN — IPRATROPIUM BROMIDE AND ALBUTEROL SULFATE 1 AMPULE: .5; 3 SOLUTION RESPIRATORY (INHALATION) at 22:30

## 2017-09-10 RX ADMIN — IPRATROPIUM BROMIDE AND ALBUTEROL SULFATE 1 AMPULE: .5; 3 SOLUTION RESPIRATORY (INHALATION) at 10:21

## 2017-09-10 RX ADMIN — METOPROLOL TARTRATE 2.5 MG: 5 INJECTION INTRAVENOUS at 00:00

## 2017-09-10 RX ADMIN — MEROPENEM 0.5 G: 1 INJECTION, POWDER, FOR SOLUTION INTRAVENOUS at 15:25

## 2017-09-10 RX ADMIN — PANTOPRAZOLE SODIUM 40 MG: 40 TABLET, DELAYED RELEASE ORAL at 16:44

## 2017-09-10 RX ADMIN — AMIODARONE HYDROCHLORIDE 300 MG: 200 TABLET ORAL at 08:57

## 2017-09-10 RX ADMIN — INSULIN GLARGINE 5 UNITS: 100 INJECTION, SOLUTION SUBCUTANEOUS at 20:51

## 2017-09-10 RX ADMIN — Medication 10 ML: at 06:50

## 2017-09-10 RX ADMIN — INSULIN LISPRO 3 UNITS: 100 INJECTION, SOLUTION INTRAVENOUS; SUBCUTANEOUS at 16:44

## 2017-09-10 RX ADMIN — INSULIN LISPRO 6 UNITS: 100 INJECTION, SOLUTION INTRAVENOUS; SUBCUTANEOUS at 20:59

## 2017-09-10 RX ADMIN — CARVEDILOL 6.25 MG: 6.25 TABLET, FILM COATED ORAL at 20:51

## 2017-09-10 RX ADMIN — IPRATROPIUM BROMIDE AND ALBUTEROL SULFATE 1 AMPULE: .5; 3 SOLUTION RESPIRATORY (INHALATION) at 02:34

## 2017-09-10 RX ADMIN — LEVOTHYROXINE SODIUM 25 MCG: 25 TABLET ORAL at 06:49

## 2017-09-10 RX ADMIN — ATORVASTATIN CALCIUM 40 MG: 40 TABLET, FILM COATED ORAL at 08:57

## 2017-09-10 RX ADMIN — CARVEDILOL 6.25 MG: 6.25 TABLET, FILM COATED ORAL at 08:57

## 2017-09-10 RX ADMIN — IPRATROPIUM BROMIDE AND ALBUTEROL SULFATE 1 AMPULE: .5; 3 SOLUTION RESPIRATORY (INHALATION) at 14:38

## 2017-09-10 RX ADMIN — IPRATROPIUM BROMIDE AND ALBUTEROL SULFATE 1 AMPULE: .5; 3 SOLUTION RESPIRATORY (INHALATION) at 18:41

## 2017-09-11 LAB
GLUCOSE BLD-MCNC: 130 MG/DL (ref 70–99)
GLUCOSE BLD-MCNC: 169 MG/DL (ref 70–99)
GLUCOSE BLD-MCNC: 186 MG/DL (ref 70–99)
GLUCOSE BLD-MCNC: 193 MG/DL (ref 70–99)
PERFORMED ON: ABNORMAL

## 2017-09-11 PROCEDURE — 99232 SBSQ HOSP IP/OBS MODERATE 35: CPT | Performed by: INTERNAL MEDICINE

## 2017-09-11 PROCEDURE — 94640 AIRWAY INHALATION TREATMENT: CPT

## 2017-09-11 PROCEDURE — 2580000003 HC RX 258: Performed by: NURSE PRACTITIONER

## 2017-09-11 PROCEDURE — 6370000000 HC RX 637 (ALT 250 FOR IP): Performed by: INTERNAL MEDICINE

## 2017-09-11 PROCEDURE — 92526 ORAL FUNCTION THERAPY: CPT

## 2017-09-11 PROCEDURE — 99233 SBSQ HOSP IP/OBS HIGH 50: CPT | Performed by: HOSPITALIST

## 2017-09-11 PROCEDURE — 6370000000 HC RX 637 (ALT 250 FOR IP): Performed by: HOSPITALIST

## 2017-09-11 PROCEDURE — 2580000003 HC RX 258: Performed by: HOSPITALIST

## 2017-09-11 PROCEDURE — 2140000000 HC CCU INTERMEDIATE R&B

## 2017-09-11 PROCEDURE — 6360000002 HC RX W HCPCS: Performed by: NURSE PRACTITIONER

## 2017-09-11 PROCEDURE — 2580000003 HC RX 258: Performed by: FAMILY MEDICINE

## 2017-09-11 PROCEDURE — 6370000000 HC RX 637 (ALT 250 FOR IP): Performed by: FAMILY MEDICINE

## 2017-09-11 PROCEDURE — 2700000000 HC OXYGEN THERAPY PER DAY

## 2017-09-11 PROCEDURE — 82948 REAGENT STRIP/BLOOD GLUCOSE: CPT

## 2017-09-11 PROCEDURE — 6360000002 HC RX W HCPCS: Performed by: HOSPITALIST

## 2017-09-11 RX ADMIN — IPRATROPIUM BROMIDE AND ALBUTEROL SULFATE 1 AMPULE: .5; 3 SOLUTION RESPIRATORY (INHALATION) at 10:51

## 2017-09-11 RX ADMIN — IPRATROPIUM BROMIDE AND ALBUTEROL SULFATE 1 AMPULE: .5; 3 SOLUTION RESPIRATORY (INHALATION) at 07:29

## 2017-09-11 RX ADMIN — CARVEDILOL 6.25 MG: 6.25 TABLET, FILM COATED ORAL at 20:25

## 2017-09-11 RX ADMIN — CARVEDILOL 6.25 MG: 6.25 TABLET, FILM COATED ORAL at 08:59

## 2017-09-11 RX ADMIN — IPRATROPIUM BROMIDE AND ALBUTEROL SULFATE 1 AMPULE: .5; 3 SOLUTION RESPIRATORY (INHALATION) at 15:35

## 2017-09-11 RX ADMIN — LEVOTHYROXINE SODIUM 25 MCG: 25 TABLET ORAL at 07:30

## 2017-09-11 RX ADMIN — IPRATROPIUM BROMIDE AND ALBUTEROL SULFATE 1 AMPULE: .5; 3 SOLUTION RESPIRATORY (INHALATION) at 19:01

## 2017-09-11 RX ADMIN — INSULIN GLARGINE 5 UNITS: 100 INJECTION, SOLUTION SUBCUTANEOUS at 20:26

## 2017-09-11 RX ADMIN — INSULIN LISPRO 3 UNITS: 100 INJECTION, SOLUTION INTRAVENOUS; SUBCUTANEOUS at 20:26

## 2017-09-11 RX ADMIN — PANTOPRAZOLE SODIUM 40 MG: 40 TABLET, DELAYED RELEASE ORAL at 17:29

## 2017-09-11 RX ADMIN — IPRATROPIUM BROMIDE AND ALBUTEROL SULFATE 1 AMPULE: .5; 3 SOLUTION RESPIRATORY (INHALATION) at 22:57

## 2017-09-11 RX ADMIN — MEROPENEM 0.5 G: 1 INJECTION, POWDER, FOR SOLUTION INTRAVENOUS at 03:30

## 2017-09-11 RX ADMIN — MEROPENEM 0.5 G: 1 INJECTION, POWDER, FOR SOLUTION INTRAVENOUS at 13:46

## 2017-09-11 RX ADMIN — INSULIN LISPRO 4 UNITS: 100 INJECTION, SOLUTION INTRAVENOUS; SUBCUTANEOUS at 09:01

## 2017-09-11 RX ADMIN — ASPIRIN 81 MG CHEWABLE TABLET 81 MG: 81 TABLET CHEWABLE at 08:59

## 2017-09-11 RX ADMIN — IRON SUCROSE 200 MG: 20 INJECTION, SOLUTION INTRAVENOUS at 10:53

## 2017-09-11 RX ADMIN — INSULIN LISPRO 4 UNITS: 100 INJECTION, SOLUTION INTRAVENOUS; SUBCUTANEOUS at 17:30

## 2017-09-11 RX ADMIN — ATORVASTATIN CALCIUM 40 MG: 40 TABLET, FILM COATED ORAL at 08:59

## 2017-09-11 RX ADMIN — PANTOPRAZOLE SODIUM 40 MG: 40 TABLET, DELAYED RELEASE ORAL at 07:30

## 2017-09-11 RX ADMIN — Medication 10 ML: at 09:00

## 2017-09-11 ASSESSMENT — ENCOUNTER SYMPTOMS
SHORTNESS OF BREATH: 0
SORE THROAT: 0
WHEEZING: 0
VOMITING: 0
DIARRHEA: 0
ABDOMINAL PAIN: 0
SPUTUM PRODUCTION: 0
BLURRED VISION: 0
COUGH: 0
NAUSEA: 0
CONSTIPATION: 0

## 2017-09-11 ASSESSMENT — PAIN SCALES - GENERAL: PAINLEVEL_OUTOF10: 0

## 2017-09-12 PROBLEM — E87.6 HYPOKALEMIA: Status: ACTIVE | Noted: 2017-09-12

## 2017-09-12 LAB
ANION GAP SERPL CALCULATED.3IONS-SCNC: 16 MMOL/L (ref 7–19)
BASOPHILS ABSOLUTE: 0 K/UL (ref 0–0.2)
BASOPHILS RELATIVE PERCENT: 0.1 % (ref 0–1)
BUN BLDV-MCNC: 37 MG/DL (ref 8–23)
CALCIUM SERPL-MCNC: 8.2 MG/DL (ref 8.2–9.6)
CHLORIDE BLD-SCNC: 105 MMOL/L (ref 98–111)
CO2: 22 MMOL/L (ref 22–29)
CREAT SERPL-MCNC: 1.4 MG/DL (ref 0.5–0.9)
CULTURE, BLOOD 2: NORMAL
EKG P AXIS: NORMAL DEGREES
EKG P-R INTERVAL: NORMAL MS
EKG Q-T INTERVAL: 318 MS
EKG QRS DURATION: 132 MS
EKG QTC CALCULATION (BAZETT): 454 MS
EKG T AXIS: -77 DEGREES
EOSINOPHILS ABSOLUTE: 0.1 K/UL (ref 0–0.6)
EOSINOPHILS RELATIVE PERCENT: 0.8 % (ref 0–5)
GFR NON-AFRICAN AMERICAN: 35
GLUCOSE BLD-MCNC: 110 MG/DL (ref 70–99)
GLUCOSE BLD-MCNC: 171 MG/DL (ref 70–99)
GLUCOSE BLD-MCNC: 174 MG/DL (ref 70–99)
GLUCOSE BLD-MCNC: 192 MG/DL (ref 70–99)
GLUCOSE BLD-MCNC: 82 MG/DL (ref 74–109)
HCT VFR BLD CALC: 25.8 % (ref 37–47)
HEMOGLOBIN: 8.1 G/DL (ref 12–16)
LYMPHOCYTES ABSOLUTE: 1.8 K/UL (ref 1.1–4.5)
LYMPHOCYTES RELATIVE PERCENT: 15.5 % (ref 20–40)
MCH RBC QN AUTO: 27 PG (ref 27–31)
MCHC RBC AUTO-ENTMCNC: 31.4 G/DL (ref 33–37)
MCV RBC AUTO: 86 FL (ref 81–99)
MONOCYTES ABSOLUTE: 1 K/UL (ref 0–0.9)
MONOCYTES RELATIVE PERCENT: 8.3 % (ref 0–10)
NEUTROPHILS ABSOLUTE: 8.7 K/UL (ref 1.5–7.5)
NEUTROPHILS RELATIVE PERCENT: 74.4 % (ref 50–65)
PDW BLD-RTO: 18.3 % (ref 11.5–14.5)
PERFORMED ON: ABNORMAL
PLATELET # BLD: 220 K/UL (ref 130–400)
PMV BLD AUTO: 12.7 FL (ref 9.4–12.3)
POTASSIUM SERPL-SCNC: 3.3 MMOL/L (ref 3.5–5)
RBC # BLD: 3 M/UL (ref 4.2–5.4)
SODIUM BLD-SCNC: 143 MMOL/L (ref 136–145)
WBC # BLD: 11.7 K/UL (ref 4.8–10.8)

## 2017-09-12 PROCEDURE — 80048 BASIC METABOLIC PNL TOTAL CA: CPT

## 2017-09-12 PROCEDURE — 6370000000 HC RX 637 (ALT 250 FOR IP): Performed by: INTERNAL MEDICINE

## 2017-09-12 PROCEDURE — 6370000000 HC RX 637 (ALT 250 FOR IP): Performed by: NURSE PRACTITIONER

## 2017-09-12 PROCEDURE — 94640 AIRWAY INHALATION TREATMENT: CPT

## 2017-09-12 PROCEDURE — 6370000000 HC RX 637 (ALT 250 FOR IP): Performed by: HOSPITALIST

## 2017-09-12 PROCEDURE — 6360000002 HC RX W HCPCS: Performed by: HOSPITALIST

## 2017-09-12 PROCEDURE — 82948 REAGENT STRIP/BLOOD GLUCOSE: CPT

## 2017-09-12 PROCEDURE — 6360000002 HC RX W HCPCS: Performed by: NURSE PRACTITIONER

## 2017-09-12 PROCEDURE — 2580000003 HC RX 258: Performed by: NURSE PRACTITIONER

## 2017-09-12 PROCEDURE — 2580000003 HC RX 258: Performed by: HOSPITALIST

## 2017-09-12 PROCEDURE — 85025 COMPLETE CBC W/AUTO DIFF WBC: CPT

## 2017-09-12 PROCEDURE — 92526 ORAL FUNCTION THERAPY: CPT

## 2017-09-12 PROCEDURE — 2140000000 HC CCU INTERMEDIATE R&B

## 2017-09-12 PROCEDURE — 2580000003 HC RX 258: Performed by: FAMILY MEDICINE

## 2017-09-12 PROCEDURE — 2700000000 HC OXYGEN THERAPY PER DAY

## 2017-09-12 PROCEDURE — 99232 SBSQ HOSP IP/OBS MODERATE 35: CPT | Performed by: INTERNAL MEDICINE

## 2017-09-12 PROCEDURE — 6370000000 HC RX 637 (ALT 250 FOR IP): Performed by: FAMILY MEDICINE

## 2017-09-12 PROCEDURE — 36415 COLL VENOUS BLD VENIPUNCTURE: CPT

## 2017-09-12 RX ORDER — TAMSULOSIN HYDROCHLORIDE 0.4 MG/1
0.4 CAPSULE ORAL NIGHTLY
Status: DISCONTINUED | OUTPATIENT
Start: 2017-09-12 | End: 2017-09-13

## 2017-09-12 RX ORDER — SPIRONOLACTONE 25 MG/1
25 TABLET ORAL DAILY
Status: DISCONTINUED | OUTPATIENT
Start: 2017-09-13 | End: 2017-09-14 | Stop reason: HOSPADM

## 2017-09-12 RX ORDER — FUROSEMIDE 20 MG/1
20 TABLET ORAL DAILY
Status: DISCONTINUED | OUTPATIENT
Start: 2017-09-12 | End: 2017-09-14 | Stop reason: HOSPADM

## 2017-09-12 RX ORDER — POTASSIUM CHLORIDE 20MEQ/15ML
40 LIQUID (ML) ORAL ONCE
Status: COMPLETED | OUTPATIENT
Start: 2017-09-12 | End: 2017-09-12

## 2017-09-12 RX ADMIN — CARVEDILOL 6.25 MG: 6.25 TABLET, FILM COATED ORAL at 08:54

## 2017-09-12 RX ADMIN — ASPIRIN 81 MG CHEWABLE TABLET 81 MG: 81 TABLET CHEWABLE at 08:54

## 2017-09-12 RX ADMIN — TAMSULOSIN HYDROCHLORIDE 0.4 MG: 0.4 CAPSULE ORAL at 22:09

## 2017-09-12 RX ADMIN — INSULIN LISPRO 3 UNITS: 100 INJECTION, SOLUTION INTRAVENOUS; SUBCUTANEOUS at 11:54

## 2017-09-12 RX ADMIN — IPRATROPIUM BROMIDE AND ALBUTEROL SULFATE 1 AMPULE: .5; 3 SOLUTION RESPIRATORY (INHALATION) at 22:37

## 2017-09-12 RX ADMIN — CARVEDILOL 6.25 MG: 6.25 TABLET, FILM COATED ORAL at 22:09

## 2017-09-12 RX ADMIN — ATORVASTATIN CALCIUM 40 MG: 40 TABLET, FILM COATED ORAL at 08:54

## 2017-09-12 RX ADMIN — INSULIN LISPRO 3 UNITS: 100 INJECTION, SOLUTION INTRAVENOUS; SUBCUTANEOUS at 17:21

## 2017-09-12 RX ADMIN — LEVOTHYROXINE SODIUM 25 MCG: 25 TABLET ORAL at 05:39

## 2017-09-12 RX ADMIN — IPRATROPIUM BROMIDE AND ALBUTEROL SULFATE 1 AMPULE: .5; 3 SOLUTION RESPIRATORY (INHALATION) at 19:06

## 2017-09-12 RX ADMIN — MEROPENEM 0.5 G: 1 INJECTION, POWDER, FOR SOLUTION INTRAVENOUS at 14:31

## 2017-09-12 RX ADMIN — IPRATROPIUM BROMIDE AND ALBUTEROL SULFATE 1 AMPULE: .5; 3 SOLUTION RESPIRATORY (INHALATION) at 07:25

## 2017-09-12 RX ADMIN — PANTOPRAZOLE SODIUM 40 MG: 40 TABLET, DELAYED RELEASE ORAL at 05:39

## 2017-09-12 RX ADMIN — INSULIN LISPRO 4 UNITS: 100 INJECTION, SOLUTION INTRAVENOUS; SUBCUTANEOUS at 22:10

## 2017-09-12 RX ADMIN — Medication 10 ML: at 22:11

## 2017-09-12 RX ADMIN — DIGOXIN 125 MCG: 125 TABLET ORAL at 08:54

## 2017-09-12 RX ADMIN — INSULIN GLARGINE 5 UNITS: 100 INJECTION, SOLUTION SUBCUTANEOUS at 22:10

## 2017-09-12 RX ADMIN — IPRATROPIUM BROMIDE AND ALBUTEROL SULFATE 1 AMPULE: .5; 3 SOLUTION RESPIRATORY (INHALATION) at 15:23

## 2017-09-12 RX ADMIN — POTASSIUM CHLORIDE 40 MEQ: 20 SOLUTION ORAL at 11:55

## 2017-09-12 RX ADMIN — IRON SUCROSE 200 MG: 20 INJECTION, SOLUTION INTRAVENOUS at 08:54

## 2017-09-12 RX ADMIN — FUROSEMIDE 20 MG: 20 TABLET ORAL at 18:51

## 2017-09-12 RX ADMIN — MEROPENEM 0.5 G: 1 INJECTION, POWDER, FOR SOLUTION INTRAVENOUS at 02:24

## 2017-09-12 RX ADMIN — Medication 10 ML: at 08:54

## 2017-09-12 RX ADMIN — PANTOPRAZOLE SODIUM 40 MG: 40 TABLET, DELAYED RELEASE ORAL at 16:46

## 2017-09-12 RX ADMIN — IPRATROPIUM BROMIDE AND ALBUTEROL SULFATE 1 AMPULE: .5; 3 SOLUTION RESPIRATORY (INHALATION) at 11:08

## 2017-09-12 ASSESSMENT — PAIN SCALES - GENERAL
PAINLEVEL_OUTOF10: 0
PAINLEVEL_OUTOF10: 0

## 2017-09-12 ASSESSMENT — ENCOUNTER SYMPTOMS
COUGH: 0
SORE THROAT: 0
ABDOMINAL PAIN: 0
SHORTNESS OF BREATH: 0
DIARRHEA: 0
NAUSEA: 0
VOMITING: 0
BLURRED VISION: 0
CONSTIPATION: 0
SPUTUM PRODUCTION: 0
WHEEZING: 0

## 2017-09-13 LAB
ANION GAP SERPL CALCULATED.3IONS-SCNC: 13 MMOL/L (ref 7–19)
BASOPHILS ABSOLUTE: 0 K/UL (ref 0–0.2)
BASOPHILS RELATIVE PERCENT: 0.3 % (ref 0–1)
BUN BLDV-MCNC: 31 MG/DL (ref 8–23)
CALCIUM SERPL-MCNC: 8.3 MG/DL (ref 8.2–9.6)
CHLORIDE BLD-SCNC: 108 MMOL/L (ref 98–111)
CO2: 19 MMOL/L (ref 22–29)
CREAT SERPL-MCNC: 1.3 MG/DL (ref 0.5–0.9)
EKG P AXIS: NORMAL DEGREES
EKG P-R INTERVAL: NORMAL MS
EKG Q-T INTERVAL: 316 MS
EKG QRS DURATION: 130 MS
EKG QTC CALCULATION (BAZETT): 438 MS
EKG T AXIS: 25 DEGREES
EOSINOPHILS ABSOLUTE: 0.1 K/UL (ref 0–0.6)
EOSINOPHILS RELATIVE PERCENT: 0.6 % (ref 0–5)
GFR NON-AFRICAN AMERICAN: 38
GLUCOSE BLD-MCNC: 122 MG/DL (ref 70–99)
GLUCOSE BLD-MCNC: 125 MG/DL (ref 70–99)
GLUCOSE BLD-MCNC: 166 MG/DL (ref 70–99)
GLUCOSE BLD-MCNC: 193 MG/DL (ref 74–109)
GLUCOSE BLD-MCNC: 194 MG/DL (ref 70–99)
HCT VFR BLD CALC: 28.2 % (ref 37–47)
HEMOGLOBIN: 8.7 G/DL (ref 12–16)
LYMPHOCYTES ABSOLUTE: 1.5 K/UL (ref 1.1–4.5)
LYMPHOCYTES RELATIVE PERCENT: 9.3 % (ref 20–40)
MCH RBC QN AUTO: 27.7 PG (ref 27–31)
MCHC RBC AUTO-ENTMCNC: 30.9 G/DL (ref 33–37)
MCV RBC AUTO: 89.8 FL (ref 81–99)
MONOCYTES ABSOLUTE: 1.5 K/UL (ref 0–0.9)
MONOCYTES RELATIVE PERCENT: 9.3 % (ref 0–10)
NEUTROPHILS ABSOLUTE: 12.5 K/UL (ref 1.5–7.5)
NEUTROPHILS RELATIVE PERCENT: 78.9 % (ref 50–65)
PDW BLD-RTO: 19 % (ref 11.5–14.5)
PERFORMED ON: ABNORMAL
PLATELET # BLD: 245 K/UL (ref 130–400)
PMV BLD AUTO: 11.8 FL (ref 9.4–12.3)
POTASSIUM SERPL-SCNC: 4.2 MMOL/L (ref 3.5–5)
RBC # BLD: 3.14 M/UL (ref 4.2–5.4)
SODIUM BLD-SCNC: 140 MMOL/L (ref 136–145)
WBC # BLD: 15.8 K/UL (ref 4.8–10.8)

## 2017-09-13 PROCEDURE — 80048 BASIC METABOLIC PNL TOTAL CA: CPT

## 2017-09-13 PROCEDURE — 36415 COLL VENOUS BLD VENIPUNCTURE: CPT

## 2017-09-13 PROCEDURE — 2140000000 HC CCU INTERMEDIATE R&B

## 2017-09-13 PROCEDURE — 94640 AIRWAY INHALATION TREATMENT: CPT

## 2017-09-13 PROCEDURE — 6370000000 HC RX 637 (ALT 250 FOR IP): Performed by: HOSPITALIST

## 2017-09-13 PROCEDURE — 92526 ORAL FUNCTION THERAPY: CPT

## 2017-09-13 PROCEDURE — 6360000002 HC RX W HCPCS: Performed by: HOSPITALIST

## 2017-09-13 PROCEDURE — 99232 SBSQ HOSP IP/OBS MODERATE 35: CPT | Performed by: INTERNAL MEDICINE

## 2017-09-13 PROCEDURE — 6370000000 HC RX 637 (ALT 250 FOR IP): Performed by: INTERNAL MEDICINE

## 2017-09-13 PROCEDURE — 6360000002 HC RX W HCPCS: Performed by: NURSE PRACTITIONER

## 2017-09-13 PROCEDURE — 2580000003 HC RX 258: Performed by: NURSE PRACTITIONER

## 2017-09-13 PROCEDURE — 6370000000 HC RX 637 (ALT 250 FOR IP): Performed by: FAMILY MEDICINE

## 2017-09-13 PROCEDURE — 82948 REAGENT STRIP/BLOOD GLUCOSE: CPT

## 2017-09-13 PROCEDURE — 2580000003 HC RX 258: Performed by: HOSPITALIST

## 2017-09-13 PROCEDURE — 2580000003 HC RX 258: Performed by: FAMILY MEDICINE

## 2017-09-13 PROCEDURE — 85025 COMPLETE CBC W/AUTO DIFF WBC: CPT

## 2017-09-13 RX ADMIN — Medication 10 ML: at 22:21

## 2017-09-13 RX ADMIN — FUROSEMIDE 20 MG: 20 TABLET ORAL at 08:26

## 2017-09-13 RX ADMIN — INSULIN GLARGINE 5 UNITS: 100 INJECTION, SOLUTION SUBCUTANEOUS at 22:18

## 2017-09-13 RX ADMIN — SPIRONOLACTONE 25 MG: 25 TABLET, FILM COATED ORAL at 08:26

## 2017-09-13 RX ADMIN — ASPIRIN 81 MG CHEWABLE TABLET 81 MG: 81 TABLET CHEWABLE at 08:26

## 2017-09-13 RX ADMIN — INSULIN LISPRO 4 UNITS: 100 INJECTION, SOLUTION INTRAVENOUS; SUBCUTANEOUS at 08:27

## 2017-09-13 RX ADMIN — LEVOTHYROXINE SODIUM 25 MCG: 25 TABLET ORAL at 05:31

## 2017-09-13 RX ADMIN — IPRATROPIUM BROMIDE AND ALBUTEROL SULFATE 1 AMPULE: .5; 3 SOLUTION RESPIRATORY (INHALATION) at 15:18

## 2017-09-13 RX ADMIN — ACETAMINOPHEN 650 MG: 325 TABLET, FILM COATED ORAL at 18:02

## 2017-09-13 RX ADMIN — MEROPENEM 0.5 G: 1 INJECTION, POWDER, FOR SOLUTION INTRAVENOUS at 15:46

## 2017-09-13 RX ADMIN — IPRATROPIUM BROMIDE AND ALBUTEROL SULFATE 1 AMPULE: .5; 3 SOLUTION RESPIRATORY (INHALATION) at 10:23

## 2017-09-13 RX ADMIN — PANTOPRAZOLE SODIUM 40 MG: 40 TABLET, DELAYED RELEASE ORAL at 16:23

## 2017-09-13 RX ADMIN — MEROPENEM 0.5 G: 1 INJECTION, POWDER, FOR SOLUTION INTRAVENOUS at 02:01

## 2017-09-13 RX ADMIN — PANTOPRAZOLE SODIUM 40 MG: 40 TABLET, DELAYED RELEASE ORAL at 05:32

## 2017-09-13 RX ADMIN — Medication 10 ML: at 08:26

## 2017-09-13 RX ADMIN — IRON SUCROSE 200 MG: 20 INJECTION, SOLUTION INTRAVENOUS at 11:50

## 2017-09-13 RX ADMIN — IPRATROPIUM BROMIDE AND ALBUTEROL SULFATE 1 AMPULE: .5; 3 SOLUTION RESPIRATORY (INHALATION) at 02:28

## 2017-09-13 RX ADMIN — ATORVASTATIN CALCIUM 40 MG: 40 TABLET, FILM COATED ORAL at 08:26

## 2017-09-13 RX ADMIN — CARVEDILOL 6.25 MG: 6.25 TABLET, FILM COATED ORAL at 08:26

## 2017-09-13 RX ADMIN — CARVEDILOL 6.25 MG: 6.25 TABLET, FILM COATED ORAL at 22:18

## 2017-09-13 RX ADMIN — IPRATROPIUM BROMIDE AND ALBUTEROL SULFATE 1 AMPULE: .5; 3 SOLUTION RESPIRATORY (INHALATION) at 06:58

## 2017-09-13 RX ADMIN — INSULIN LISPRO 3 UNITS: 100 INJECTION, SOLUTION INTRAVENOUS; SUBCUTANEOUS at 17:22

## 2017-09-13 ASSESSMENT — PAIN SCALES - GENERAL
PAINLEVEL_OUTOF10: 0
PAINLEVEL_OUTOF10: 8
PAINLEVEL_OUTOF10: 0

## 2017-09-14 VITALS
RESPIRATION RATE: 16 BRPM | DIASTOLIC BLOOD PRESSURE: 71 MMHG | SYSTOLIC BLOOD PRESSURE: 129 MMHG | OXYGEN SATURATION: 93 % | TEMPERATURE: 97.3 F | HEART RATE: 94 BPM | HEIGHT: 65 IN | BODY MASS INDEX: 19.89 KG/M2 | WEIGHT: 119.4 LBS

## 2017-09-14 PROBLEM — D50.9 IRON DEFICIENCY ANEMIA: Status: ACTIVE | Noted: 2017-09-14

## 2017-09-14 PROBLEM — K92.2 ACUTE GI BLEEDING: Status: ACTIVE | Noted: 2017-09-14

## 2017-09-14 PROBLEM — I48.20 CHRONIC ATRIAL FIBRILLATION (HCC): Status: ACTIVE | Noted: 2017-09-14

## 2017-09-14 LAB
ANION GAP SERPL CALCULATED.3IONS-SCNC: 14 MMOL/L (ref 7–19)
BASOPHILS ABSOLUTE: 0 K/UL (ref 0–0.2)
BASOPHILS RELATIVE PERCENT: 0.3 % (ref 0–1)
BUN BLDV-MCNC: 26 MG/DL (ref 8–23)
CALCIUM SERPL-MCNC: 8.3 MG/DL (ref 8.2–9.6)
CHLORIDE BLD-SCNC: 107 MMOL/L (ref 98–111)
CO2: 22 MMOL/L (ref 22–29)
CREAT SERPL-MCNC: 1 MG/DL (ref 0.5–0.9)
EOSINOPHILS ABSOLUTE: 0.2 K/UL (ref 0–0.6)
EOSINOPHILS RELATIVE PERCENT: 1.1 % (ref 0–5)
GFR NON-AFRICAN AMERICAN: 52
GLUCOSE BLD-MCNC: 130 MG/DL (ref 74–109)
GLUCOSE BLD-MCNC: 149 MG/DL (ref 70–99)
GLUCOSE BLD-MCNC: 150 MG/DL (ref 70–99)
HCT VFR BLD CALC: 29.1 % (ref 37–47)
HEMOGLOBIN: 8.7 G/DL (ref 12–16)
LYMPHOCYTES ABSOLUTE: 1.8 K/UL (ref 1.1–4.5)
LYMPHOCYTES RELATIVE PERCENT: 11.3 % (ref 20–40)
MCH RBC QN AUTO: 27.2 PG (ref 27–31)
MCHC RBC AUTO-ENTMCNC: 29.9 G/DL (ref 33–37)
MCV RBC AUTO: 90.9 FL (ref 81–99)
MONOCYTES ABSOLUTE: 1.5 K/UL (ref 0–0.9)
MONOCYTES RELATIVE PERCENT: 9.4 % (ref 0–10)
NEUTROPHILS ABSOLUTE: 12.1 K/UL (ref 1.5–7.5)
NEUTROPHILS RELATIVE PERCENT: 76.3 % (ref 50–65)
PDW BLD-RTO: 19.6 % (ref 11.5–14.5)
PERFORMED ON: ABNORMAL
PERFORMED ON: ABNORMAL
PLATELET # BLD: 252 K/UL (ref 130–400)
PMV BLD AUTO: 12.3 FL (ref 9.4–12.3)
POTASSIUM SERPL-SCNC: 3.8 MMOL/L (ref 3.5–5)
RBC # BLD: 3.2 M/UL (ref 4.2–5.4)
SODIUM BLD-SCNC: 143 MMOL/L (ref 136–145)
WBC # BLD: 15.9 K/UL (ref 4.8–10.8)

## 2017-09-14 PROCEDURE — 6370000000 HC RX 637 (ALT 250 FOR IP): Performed by: HOSPITALIST

## 2017-09-14 PROCEDURE — 80048 BASIC METABOLIC PNL TOTAL CA: CPT

## 2017-09-14 PROCEDURE — 6370000000 HC RX 637 (ALT 250 FOR IP): Performed by: FAMILY MEDICINE

## 2017-09-14 PROCEDURE — 6360000002 HC RX W HCPCS: Performed by: HOSPITALIST

## 2017-09-14 PROCEDURE — 85025 COMPLETE CBC W/AUTO DIFF WBC: CPT

## 2017-09-14 PROCEDURE — 82948 REAGENT STRIP/BLOOD GLUCOSE: CPT

## 2017-09-14 PROCEDURE — 36415 COLL VENOUS BLD VENIPUNCTURE: CPT

## 2017-09-14 PROCEDURE — 94640 AIRWAY INHALATION TREATMENT: CPT

## 2017-09-14 PROCEDURE — 6370000000 HC RX 637 (ALT 250 FOR IP): Performed by: INTERNAL MEDICINE

## 2017-09-14 PROCEDURE — 2580000003 HC RX 258: Performed by: HOSPITALIST

## 2017-09-14 PROCEDURE — 2580000003 HC RX 258: Performed by: FAMILY MEDICINE

## 2017-09-14 PROCEDURE — 99239 HOSP IP/OBS DSCHRG MGMT >30: CPT | Performed by: INTERNAL MEDICINE

## 2017-09-14 RX ORDER — INSULIN GLARGINE 100 [IU]/ML
5 INJECTION, SOLUTION SUBCUTANEOUS NIGHTLY
Qty: 1 VIAL | Refills: 0 | Status: SHIPPED | OUTPATIENT
Start: 2017-09-14

## 2017-09-14 RX ORDER — SPIRONOLACTONE 25 MG/1
25 TABLET ORAL DAILY
Qty: 30 TABLET | Refills: 0 | Status: SHIPPED | OUTPATIENT
Start: 2017-09-14 | End: 2018-01-05

## 2017-09-14 RX ORDER — ASPIRIN 81 MG/1
81 TABLET, CHEWABLE ORAL DAILY
Qty: 30 TABLET | Refills: 3 | Status: SHIPPED | OUTPATIENT
Start: 2017-09-14

## 2017-09-14 RX ORDER — CARVEDILOL 12.5 MG/1
6.25 TABLET ORAL 2 TIMES DAILY
Qty: 30 TABLET | Refills: 0 | Status: SHIPPED | OUTPATIENT
Start: 2017-09-14

## 2017-09-14 RX ORDER — FUROSEMIDE 40 MG/1
20 TABLET ORAL DAILY
Qty: 15 TABLET | Refills: 0 | Status: ON HOLD | OUTPATIENT
Start: 2017-09-14 | End: 2018-01-13 | Stop reason: HOSPADM

## 2017-09-14 RX ADMIN — SPIRONOLACTONE 25 MG: 25 TABLET, FILM COATED ORAL at 09:38

## 2017-09-14 RX ADMIN — FUROSEMIDE 20 MG: 20 TABLET ORAL at 09:38

## 2017-09-14 RX ADMIN — IPRATROPIUM BROMIDE AND ALBUTEROL SULFATE 1 AMPULE: .5; 3 SOLUTION RESPIRATORY (INHALATION) at 07:07

## 2017-09-14 RX ADMIN — ATORVASTATIN CALCIUM 40 MG: 40 TABLET, FILM COATED ORAL at 09:38

## 2017-09-14 RX ADMIN — DIGOXIN 125 MCG: 125 TABLET ORAL at 09:38

## 2017-09-14 RX ADMIN — MEROPENEM 0.5 G: 1 INJECTION, POWDER, FOR SOLUTION INTRAVENOUS at 03:12

## 2017-09-14 RX ADMIN — Medication 10 ML: at 09:38

## 2017-09-14 RX ADMIN — LEVOTHYROXINE SODIUM 25 MCG: 25 TABLET ORAL at 05:54

## 2017-09-14 RX ADMIN — PANTOPRAZOLE SODIUM 40 MG: 40 TABLET, DELAYED RELEASE ORAL at 05:54

## 2017-09-14 RX ADMIN — CARVEDILOL 6.25 MG: 6.25 TABLET, FILM COATED ORAL at 09:38

## 2017-09-14 RX ADMIN — ASPIRIN 81 MG CHEWABLE TABLET 81 MG: 81 TABLET CHEWABLE at 09:38

## 2017-09-14 ASSESSMENT — PAIN SCALES - GENERAL
PAINLEVEL_OUTOF10: 0
PAINLEVEL_OUTOF10: 0

## 2017-09-15 LAB — BLOOD CULTURE, ROUTINE: NORMAL

## 2017-10-04 ENCOUNTER — OFFICE VISIT (OUTPATIENT)
Dept: GASTROENTEROLOGY | Facility: CLINIC | Age: 82
End: 2017-10-04

## 2017-10-04 VITALS
HEIGHT: 66 IN | DIASTOLIC BLOOD PRESSURE: 81 MMHG | BODY MASS INDEX: 18.8 KG/M2 | WEIGHT: 117 LBS | SYSTOLIC BLOOD PRESSURE: 160 MMHG | HEART RATE: 76 BPM

## 2017-10-04 DIAGNOSIS — D50.9 IRON DEFICIENCY ANEMIA, UNSPECIFIED IRON DEFICIENCY ANEMIA TYPE: Primary | ICD-10-CM

## 2017-10-04 DIAGNOSIS — K92.1 BLOOD IN STOOL: ICD-10-CM

## 2017-10-04 PROCEDURE — 99213 OFFICE O/P EST LOW 20 MIN: CPT | Performed by: NURSE PRACTITIONER

## 2017-10-04 RX ORDER — SPIRONOLACTONE 25 MG/1
25 TABLET ORAL DAILY
COMMUNITY
Start: 2017-09-14 | End: 2017-12-22 | Stop reason: HOSPADM

## 2017-10-04 RX ORDER — MELATONIN
1000 DAILY
COMMUNITY

## 2017-10-04 RX ORDER — ASPIRIN 81 MG/1
81 TABLET ORAL DAILY
COMMUNITY
End: 2017-12-21 | Stop reason: ALTCHOICE

## 2017-10-04 RX ORDER — EZETIMIBE 10 MG/1
TABLET ORAL
COMMUNITY
Start: 2017-09-22 | End: 2017-12-21

## 2017-10-04 RX ORDER — ALBUTEROL SULFATE 2.5 MG/3ML
2.5 SOLUTION RESPIRATORY (INHALATION) 3 TIMES DAILY
COMMUNITY

## 2017-10-04 NOTE — PROGRESS NOTES
Chief Complaint   Patient presents with   • Anemia       Subjective    Glorine DWIGHT Lowe is a 89 y.o. female. she is being seen for follow up.    Anemia   There has been no abdominal pain, fever, light-headedness or pallor.     Patient is a 89-year-old female who reports she was seen for follow-up at primary care office and was told she had Hemoccult positive stool and anemia.  Reports she has no abdominal pain, nausea, vomiting or changes in her bowel habits.  States bowel movements are daily and she does have visible hemorrhoids.  Her weight is stable.  Reports she has never had an EGD or colonoscopy.  She had 3 Hemoccult checked which first one was positive and other 2 were negative.    Iron was 29 iron saturation 9 iron binding capacity 326.  CBC results note Hemoglobin of 8.6.  Plan; have discussed colonoscopy and EGD with patient due to Hemoccult positive stool.  However patient denies any symptoms and prefers not to schedule any procedures at this time.  she has had 1 positive Hemoccult 2 negatives we'll recheck Hemoccult and follow-up in 1 month if any symptoms persist or worsen will rather discuss endoscopy at that time.  Please obtain CBC from [facility].    The following portions of the patient's history were reviewed and updated as appropriate:   Past Medical History:   Diagnosis Date   • Altered mental status    • Atrial fibrillation    • Diabetes mellitus    • Hyperlipidemia    • Hypertension    • PFO (patent foramen ovale)    • Stroke    • Stroke      Past Surgical History:   Procedure Laterality Date   • CATARACT EXTRACTION     • CHOLECYSTECTOMY     • OTHER SURGICAL HISTORY      lung partial     Family History   Problem Relation Age of Onset   • Cancer Mother    • Heart disease Father    • Diabetes Brother    • Cancer Other    • Stroke Other      OB History     No data available        Current Outpatient Prescriptions   Medication Sig Dispense Refill   • albuterol (PROVENTIL) (2.5 MG/3ML) 0.083%  nebulizer solution Take 2.5 mg by nebulization Every 4 (Four) Hours As Needed for Wheezing.     • aspirin 81 MG EC tablet Take 81 mg by mouth Daily.     • atorvastatin (LIPITOR) 40 MG tablet Take 40 mg by mouth Daily.     • carvedilol (COREG) 12.5 MG tablet Take 12.5 mg by mouth 2 (Two) Times a Day With Meals.     • cholecalciferol (VITAMIN D3) 1000 units tablet Take 1,000 Units by mouth Daily.     • dabigatran etexilate (PRADAXA) 75 MG capsule Take 75 mg by mouth 2 (Two) Times a Day.     • digoxin (LANOXIN) 125 MCG tablet Take 125 mcg by mouth Daily.     • ezetimibe (ZETIA) 10 MG tablet      • furosemide (LASIX) 40 MG tablet      • insulin glargine (LANTUS) 100 UNIT/ML injection Inject  under the skin Every Night.     • levothyroxine (SYNTHROID, LEVOTHROID) 25 MCG tablet Take 25 mcg by mouth 2 (Two) Times a Day.     • lisinopril (PRINIVIL,ZESTRIL) 20 MG tablet Take 10 mg by mouth Daily.     • metFORMIN (GLUCOPHAGE) 500 MG tablet Take 500 mg by mouth 2 (Two) Times a Day With Meals.     • pantoprazole (PROTONIX) 40 MG EC tablet Take 40 mg by mouth Daily.     • spironolactone (ALDACTONE) 25 MG tablet        No current facility-administered medications for this visit.      No Known Allergies  Social History     Social History   • Marital status:      Spouse name: N/A   • Number of children: N/A   • Years of education: N/A     Social History Main Topics   • Smoking status: Never Smoker   • Smokeless tobacco: Never Used   • Alcohol use No   • Drug use: No   • Sexual activity: Defer     Other Topics Concern   • None     Social History Narrative       Review of Systems  Review of Systems   Constitutional: Negative for activity change, appetite change, chills, diaphoresis, fatigue, fever and unexpected weight change.   HENT: Negative for sore throat and trouble swallowing.    Respiratory: Negative for shortness of breath.    Gastrointestinal: Positive for anal bleeding (blood noted on tissue ). Negative for abdominal  "distention, abdominal pain, blood in stool, constipation, diarrhea, nausea, rectal pain and vomiting.   Musculoskeletal: Negative for arthralgias.   Skin: Negative for pallor.   Neurological: Negative for light-headedness.        /81 (BP Location: Right arm, Patient Position: Sitting, Cuff Size: Adult)  Pulse 76  Ht 65.5\" (166.4 cm)  Wt 117 lb (53.1 kg)  BMI 19.17 kg/m2    Objective    Physical Exam   Constitutional: She is oriented to person, place, and time. She appears well-developed and well-nourished. She is cooperative. No distress.   HENT:   Head: Normocephalic and atraumatic.   Neck: Normal range of motion. Neck supple. No thyromegaly present.   Cardiovascular: Normal rate, regular rhythm and normal heart sounds.    Pulmonary/Chest: Effort normal and breath sounds normal. She has no wheezes. She has no rhonchi. She has no rales.   Abdominal: Soft. Normal appearance and bowel sounds are normal. She exhibits no shifting dullness and no distension. There is no hepatosplenomegaly. There is no tenderness. There is no rigidity and no guarding. No hernia.   Lymphadenopathy:     She has no cervical adenopathy.   Neurological: She is alert and oriented to person, place, and time.   Skin: Skin is warm, dry and intact. No rash noted. No pallor.   Psychiatric: She has a normal mood and affect. Her speech is normal.     Admission on 06/21/2014, Discharged on 06/25/2014   Component Date Value Ref Range Status   • Glucose 06/21/2014 218* 70 - 100 mg/dL Final   • WBC 06/21/2014 12.05* 4.80 - 10.80 K/mcL Final   • RBC 06/21/2014 4.28  4.20 - 5.40 M/mcL Final   • Hemoglobin 06/21/2014 12.5  12.0 - 16.0 g/dL Final   • Hematocrit 06/21/2014 36.4* 37.0 - 47.0 % Final   • MCV 06/21/2014 85.0  82.0 - 98.0 fL Final   • MCH 06/21/2014 29.2  28.0 - 32.0 pg Final   • MCHC 06/21/2014 34.3  33.0 - 36.0 gm/dL Final   • RDW-SD 06/21/2014 43.0  40.0 - 54.0 fL Final   • RDW-CV 06/21/2014 14.2  12.0 - 15.0 % Final   • RDW " 06/21/2014 14.2  12 - 15 % Final   • Platelets 06/21/2014 201  130 - 400 K/mcL Final   • Neutrophil Rel % 06/21/2014 79.30* 39.00 - 78.00 % Final   • Lymphocyte Rel % 06/21/2014 12.50* 15.00 - 45.00 % Final   • Monocyte Rel % 06/21/2014 7.00  4.00 - 12.00 % Final   • Eosinophil Rel % 06/21/2014 0.70  0.00 - 4.00 % Final   • Basophil Rel % 06/21/2014 0.20  0.00 - 2.00 % Final   • Neutrophils Absolute 06/21/2014 9.56* 1.87 - 8.40 K/mcL Final   • Lymphocytes Absolute 06/21/2014 1.51  0.72 - 4.86 K/mcL Final   • Monocytes Absolute 06/21/2014 0.84  0.19 - 1.30 K/mcL Final   • Eosinophils Absolute 06/21/2014 0.08  0.00 - 0.70 K/mcL Final   • Basophils Absolute 06/21/2014 0.02  0.00 - 0.20 K/mcL Final   • Differential Type 06/21/2014 AUTO   Final   • Platelet Morphology 06/21/2014 Normal   Final   • Sodium 06/21/2014 133* 135 - 145 mmol/L Final   • Potassium 06/21/2014 3.1* 3.5 - 5.3 mmol/L Final   • Chloride 06/21/2014 90* 98 - 110 mmol/L Final   • CO2 06/21/2014 33* 24 - 31 mmol/L Final   • Glucose 06/21/2014 184* 70 - 100 mg/dL Final   • BUN 06/21/2014 20  5 - 21 mg/dL Final   • Creatinine 06/21/2014 0.96  0.5 - 1.4 mg/dL Final   • Calcium 06/21/2014 9.8  8.4 - 10.4 mg/dL Final   • Total Protein 06/21/2014 7.0  6.3 - 8.7 g/dL Final   • Albumin 06/21/2014 3.9  3.5 - 5.0 g/dL Final   • Total Bilirubin 06/21/2014 2.3* 0.1 - 1.0 mg/dL Final   • Alkaline Phosphatase 06/21/2014 84  24 - 120 Units/L Final   • AST (SGOT) 06/21/2014 29  7 - 45 Units/L Final   • ALT (SGPT) 06/21/2014 26  0 - 54 Units/L Final   • Anion Gap 06/21/2014 10  4 - 13 mmol/L Final   • eGFR 06/21/2014 55  ml/min/1.732 Final    Comment: DF by IF @ 06/21/2014 15:58  GFR Normal                            >60  Chronic Kidney Disease          <60  Kidney Failure                         <15  US by IF @ 06/21/2014 15:58  The MDRD GFR formula is only valid for adults with stable renal function between ages 18 and 70.     • Color, UA 06/21/2014 Yellow   Final   •  Appearance, UA 06/21/2014 Clear  CLEAR Final   • pH, UA 06/21/2014 6.5  5.0 - 8.0 Final   • Specific Gravity, UA 06/21/2014 1.011  1.005 - 1.030 Final   • Glucose, UA 06/21/2014 250* NEGATIVE mg/dL Final   • Ketones, UA 06/21/2014 Negative  NEGATIVE mg/dL Final   • Bilirubin, UA 06/21/2014 Negative  NEGATIVE Final   • Blood, UA 06/21/2014 Trace* NEGATIVE Final   • Protein, UA 06/21/2014 100* NEGATIVE mg/dL Final   • Nitrite, UA 06/21/2014 Negative  NEGATIVE Final   • Leukocytes, UA 06/21/2014 Negative  NEGATIVE Final   • Urobilinogen, UA 06/21/2014 2.0* 0.2,1.0 E.U./dL Final   • WBC, UA 06/21/2014 0-2* NONE SEEN /hpf Final   • RBC, UA 06/21/2014 0-2* NONE SEEN /hpf Final   • Epithelial Cells, UA 06/21/2014 0-2* NONE SEEN /hpf Final   • Bacteria, UA 06/21/2014 4+* NONE SEEN /hpf Final   • Casts 06/21/2014 None Seen  /lpf Final   • TSH 06/21/2014 2.33  0.47 - 4.68 mIU/mL Final   • Glucose 06/21/2014 152* 70 - 100 mg/dL Final   • WBC 06/22/2014 11.76* 4.80 - 10.80 K/mcL Final   • RBC 06/22/2014 4.12* 4.20 - 5.40 M/mcL Final   • Hemoglobin 06/22/2014 12.1  12.0 - 16.0 g/dL Final   • Hematocrit 06/22/2014 34.9* 37.0 - 47.0 % Final   • MCV 06/22/2014 84.7  82.0 - 98.0 fL Final   • MCH 06/22/2014 29.4  28.0 - 32.0 pg Final   • MCHC 06/22/2014 34.7  33.0 - 36.0 gm/dL Final   • RDW-SD 06/22/2014 43.3  40.0 - 54.0 fL Final   • RDW-CV 06/22/2014 14.3  12.0 - 15.0 % Final   • RDW 06/22/2014 14.3  12 - 15 % Final   • Platelets 06/22/2014 178  130 - 400 K/mcL Final   • Sodium 06/22/2014 136  135 - 145 mmol/L Final   • Potassium 06/22/2014 3.2* 3.5 - 5.3 mmol/L Final   • Chloride 06/22/2014 95* 98 - 110 mmol/L Final   • CO2 06/22/2014 30  24 - 31 mmol/L Final   • Glucose 06/22/2014 168* 70 - 100 mg/dL Final   • BUN 06/22/2014 19  5 - 21 mg/dL Final   • Creatinine 06/22/2014 0.90  0.5 - 1.4 mg/dL Final   • Calcium 06/22/2014 9.1  8.4 - 10.4 mg/dL Final   • Total Protein 06/22/2014 6.6  6.3 - 8.7 g/dL Final   • Albumin 06/22/2014  3.6  3.5 - 5.0 g/dL Final   • Total Bilirubin 06/22/2014 2.2* 0.1 - 1.0 mg/dL Final   • Alkaline Phosphatase 06/22/2014 78  24 - 120 Units/L Final   • AST (SGOT) 06/22/2014 26  7 - 45 Units/L Final   • ALT (SGPT) 06/22/2014 29  0 - 54 Units/L Final   • Anion Gap 06/22/2014 10  4 - 13 mmol/L Final   • eGFR 06/22/2014 59  ml/min/1.732 Final    Comment: DF by IF @ 06/22/2014 03:41  GFR Normal                            >60  Chronic Kidney Disease          <60  Kidney Failure                         <15  US by IF @ 06/22/2014 03:41  The MDRD GFR formula is only valid for adults with stable renal function between ages 18 and 70.     • Total Cholesterol 06/22/2014 137  130 - 200 mg/dL Final   • HDL Cholesterol 06/22/2014 34  mg/dL Final    Comment: DF by IF @ 06/22/2014 03:50  HDL Reference Range  Female: >50  Male: >40     • Triglycerides 06/22/2014 154* 0 - 149 mg/dL Final   • LDL Cholesterol  06/22/2014 73  0 - 99 mg/dL Final   • VLDL Cholesterol 06/22/2014 31  6 - 32 mg/dL Final   • LDL/HDL Ratio 06/22/2014 2.1  0.0 - 3.4 Final   • Troponin I 06/21/2014 0.119  0.000 - 0.600 ng/mL Final   • Hemoglobin A1C 06/21/2014 9.1* 0.0 - 5.9 % Final    Comment: DF by IF @ 06/21/2014 21:07  Less than 6.0           Non-Diabetic Range  6.0-7.0               ADA Therapeutic Target  Greater than 7.0        Action Suggested     • PTT 06/21/2014 27.8  24.1 - 34.8 Seconds Final   • Protime 06/21/2014 14.3  11.9 - 14.6 Seconds Final   • INR 06/21/2014 1.08  0.91 - 1.09 Final   • Glucose 06/21/2014 137* 70 - 100 mg/dL Final   • Glucose 06/22/2014 138* 70 - 100 mg/dL Final   • Glucose 06/22/2014 184* 70 - 100 mg/dL Final   • Glucose 06/22/2014 133* 70 - 100 mg/dL Final   • WBC 06/23/2014 10.62  4.80 - 10.80 K/mcL Final   • RBC 06/23/2014 4.00* 4.20 - 5.40 M/mcL Final   • Hemoglobin 06/23/2014 11.8* 12.0 - 16.0 g/dL Final   • Hematocrit 06/23/2014 34.7* 37.0 - 47.0 % Final   • MCV 06/23/2014 86.8  82.0 - 98.0 fL Final   • MCH 06/23/2014 29.5   28.0 - 32.0 pg Final   • MCHC 06/23/2014 34.0  33.0 - 36.0 gm/dL Final   • RDW-SD 06/23/2014 45.9  40.0 - 54.0 fL Final   • RDW-CV 06/23/2014 15.0  12.0 - 15.0 % Final   • RDW 06/23/2014 15.0  12 - 15 % Final   • Platelets 06/23/2014 173  130 - 400 K/mcL Final   • Sodium 06/23/2014 136  135 - 145 mmol/L Final   • Potassium 06/23/2014 3.5  3.5 - 5.3 mmol/L Final   • Chloride 06/23/2014 99  98 - 110 mmol/L Final   • CO2 06/23/2014 31  24 - 31 mmol/L Final   • Glucose 06/23/2014 85  70 - 100 mg/dL Final   • BUN 06/23/2014 20  5 - 21 mg/dL Final   • Creatinine 06/23/2014 0.88  0.5 - 1.4 mg/dL Final   • Calcium 06/23/2014 8.9  8.4 - 10.4 mg/dL Final   • Total Protein 06/23/2014 6.5  6.3 - 8.7 g/dL Final   • Albumin 06/23/2014 3.4* 3.5 - 5.0 g/dL Final   • Total Bilirubin 06/23/2014 1.5* 0.1 - 1.0 mg/dL Final   • Alkaline Phosphatase 06/23/2014 70  24 - 120 Units/L Final   • AST (SGOT) 06/23/2014 31  7 - 45 Units/L Final   • ALT (SGPT) 06/23/2014 27  0 - 54 Units/L Final   • Anion Gap 06/23/2014 7  4 - 13 mmol/L Final   • eGFR 06/23/2014 >60  ml/min/1.732 Final    Comment: DF by IF @ 06/23/2014 05:56  GFR Normal                            >60  Chronic Kidney Disease          <60  Kidney Failure                         <15  US by IF @ 06/23/2014 05:56  The MDRD GFR formula is only valid for adults with stable renal function between ages 18 and 70.     • Glucose 06/22/2014 160* 70 - 100 mg/dL Final   • Color, UA 06/22/2014 Yellow   Final   • Appearance, UA 06/22/2014 Cloudy* CLEAR Final   • pH, UA 06/22/2014 6.0  5.0 - 8.0 Final   • Specific Gravity, UA 06/22/2014 1.016  1.005 - 1.030 Final   • Glucose, UA 06/22/2014 100* NEGATIVE mg/dL Final   • Ketones, UA 06/22/2014 Trace* NEGATIVE mg/dL Final   • Bilirubin, UA 06/22/2014 Negative  NEGATIVE Final   • Blood, UA 06/22/2014 Trace* NEGATIVE Final   • Protein, UA 06/22/2014 100* NEGATIVE mg/dL Final   • Nitrite, UA 06/22/2014 Positive* NEGATIVE Final   • Leukocytes, UA  06/22/2014 Negative  NEGATIVE Final   • Urobilinogen, UA 06/22/2014 1.0  0.2,1.0 E.U./dL Final   • WBC, UA 06/22/2014 0-2* NONE SEEN /hpf Final   • RBC, UA 06/22/2014 13-20* NONE SEEN /hpf Final   • Epithelial Cells, UA 06/22/2014 0-2* NONE SEEN /hpf Final   • Bacteria, UA 06/22/2014 4+* NONE SEEN /hpf Final   • Casts 06/22/2014 0-2 Hyaline  /lpf Final   • Glucose 06/22/2014 152* 70 - 100 mg/dL Final   • Glucose 06/22/2014 175* 70 - 100 mg/dL Final   • Glucose 06/23/2014 69* 70 - 100 mg/dL Final   • Glucose 06/23/2014 110* 70 - 100 mg/dL Final   • Glucose 06/23/2014 171* 70 - 100 mg/dL Final   • WBC 06/24/2014 9.15  4.80 - 10.80 K/mcL Final   • RBC 06/24/2014 3.76* 4.20 - 5.40 M/mcL Final   • Hemoglobin 06/24/2014 11.2* 12.0 - 16.0 g/dL Final   • Hematocrit 06/24/2014 33.0* 37.0 - 47.0 % Final   • MCV 06/24/2014 87.8  82.0 - 98.0 fL Final   • MCH 06/24/2014 29.8  28.0 - 32.0 pg Final   • MCHC 06/24/2014 33.9  33.0 - 36.0 gm/dL Final   • RDW-SD 06/24/2014 49.1  40.0 - 54.0 fL Final   • RDW-CV 06/24/2014 15.4* 12.0 - 15.0 % Final   • RDW 06/24/2014 15.4* 12 - 15 % Final   • Platelets 06/24/2014 161  130 - 400 K/mcL Final   • Sodium 06/24/2014 139  135 - 145 mmol/L Final   • Potassium 06/24/2014 3.9  3.5 - 5.3 mmol/L Final   • Chloride 06/24/2014 104  98 - 110 mmol/L Final   • CO2 06/24/2014 26  24 - 31 mmol/L Final   • Glucose 06/24/2014 79  70 - 100 mg/dL Final   • BUN 06/24/2014 18  5 - 21 mg/dL Final   • Creatinine 06/24/2014 0.95  0.5 - 1.4 mg/dL Final   • Calcium 06/24/2014 8.9  8.4 - 10.4 mg/dL Final   • Total Protein 06/24/2014 5.7* 6.3 - 8.7 g/dL Final   • Albumin 06/24/2014 3.0* 3.5 - 5.0 g/dL Final   • Total Bilirubin 06/24/2014 1.4* 0.1 - 1.0 mg/dL Final   • Alkaline Phosphatase 06/24/2014 67  24 - 120 Units/L Final   • AST (SGOT) 06/24/2014 19  7 - 45 Units/L Final   • ALT (SGPT) 06/24/2014 22  0 - 54 Units/L Final   • Anion Gap 06/24/2014 9  4 - 13 mmol/L Final   • eGFR 06/24/2014 55  ml/min/1.732 Final     Comment: DF by IF @ 06/24/2014 08:56  GFR Normal                            >60  Chronic Kidney Disease          <60  Kidney Failure                         <15  US by IF @ 06/24/2014 08:56  The MDRD GFR formula is only valid for adults with stable renal function between ages 18 and 70.     • Glucose 06/23/2014 186* 70 - 100 mg/dL Final   • Glucose 06/23/2014 251* 70 - 100 mg/dL Final   • Glucose 06/24/2014 96  70 - 100 mg/dL Final   • Glucose 06/24/2014 245* 70 - 100 mg/dL Final   • Protime 06/25/2014 15.0* 11.9 - 14.6 Seconds Final   • INR 06/25/2014 1.15* 0.91 - 1.09 Final   • Protime 06/24/2014 14.1  11.9 - 14.6 Seconds Final   • INR 06/24/2014 1.06  0.91 - 1.09 Final   • Glucose 06/24/2014 248* 70 - 100 mg/dL Final   • Glucose 06/24/2014 308* 70 - 100 mg/dL Final   • Glucose 06/25/2014 151* 70 - 100 mg/dL Final   • Glucose 06/25/2014 287* 70 - 100 mg/dL Final   • Glucose 06/25/2014 247* 70 - 100 mg/dL Final   • Glucose 06/25/2014 324* 70 - 100 mg/dL Final     Assessment/Plan      1. Iron deficiency anemia, unspecified iron deficiency anemia type    2. Blood in stool    .       Orders placed during this encounter include:  Orders Placed This Encounter   Procedures   • Iron Profile   • CBC & Differential     Order Specific Question:   Manual Differential     Answer:   No       * Surgery not found *    Review and/or summary of lab tests, radiology, procedures, medications. Review and summary of old records and obtaining of history. The risks and benefits of my recommendations, as well as other treatment options were discussed with the patient today. Questions were answered.    No orders of the defined types were placed in this encounter.      Follow-up: Return in about 4 weeks (around 11/1/2017).          This document has been electronically signed by DENIS Oliver on October 30, 2017 10:02 AM             Results for orders placed or performed during the hospital encounter of 06/21/14   Urinalysis,  Microscopic only   Result Value Ref Range    WBC, UA 0-2 (A) NONE SEEN /hpf    RBC, UA 13-20 (A) NONE SEEN /hpf    Epithelial Cells, UA 0-2 (A) NONE SEEN /hpf    Bacteria, UA 4+ (A) NONE SEEN /hpf    Casts 0-2 Hyaline /lpf   Urinalysis, Microscopic only   Result Value Ref Range    WBC, UA 0-2 (A) NONE SEEN /hpf    RBC, UA 0-2 (A) NONE SEEN /hpf    Epithelial Cells, UA 0-2 (A) NONE SEEN /hpf    Bacteria, UA 4+ (A) NONE SEEN /hpf    Casts None Seen /lpf   Troponin   Result Value Ref Range    Troponin I 0.119 0.000 - 0.600 ng/mL   Urinalysis Without Microscopic   Result Value Ref Range    Color, UA Yellow     Appearance, UA Cloudy (A) CLEAR    pH, UA 6.0 5.0 - 8.0    Specific Gravity, UA 1.016 1.005 - 1.030    Glucose,  (A) NEGATIVE mg/dL    Ketones, UA Trace (A) NEGATIVE mg/dL    Bilirubin, UA Negative NEGATIVE    Blood, UA Trace (A) NEGATIVE    Protein,  (A) NEGATIVE mg/dL    Nitrite, UA Positive (A) NEGATIVE    Leukocytes, UA Negative NEGATIVE    Urobilinogen, UA 1.0 0.2,1.0 E.U./dL   Urinalysis Without Microscopic   Result Value Ref Range    Color, UA Yellow     Appearance, UA Clear CLEAR    pH, UA 6.5 5.0 - 8.0    Specific Gravity, UA 1.011 1.005 - 1.030    Glucose,  (A) NEGATIVE mg/dL    Ketones, UA Negative NEGATIVE mg/dL    Bilirubin, UA Negative NEGATIVE    Blood, UA Trace (A) NEGATIVE    Protein,  (A) NEGATIVE mg/dL    Nitrite, UA Negative NEGATIVE    Leukocytes, UA Negative NEGATIVE    Urobilinogen, UA 2.0 (A) 0.2,1.0 E.U./dL   APTT   Result Value Ref Range    PTT 27.8 24.1 - 34.8 Seconds   Protime-INR   Result Value Ref Range    Protime 14.1 11.9 - 14.6 Seconds    INR 1.06 0.91 - 1.09   Protime-INR   Result Value Ref Range    Protime 15.0 (H) 11.9 - 14.6 Seconds    INR 1.15 (H) 0.91 - 1.09   Protime-INR   Result Value Ref Range    Protime 14.3 11.9 - 14.6 Seconds    INR 1.08 0.91 - 1.09   CBC (No diff)   Result Value Ref Range    WBC 9.15 4.80 - 10.80 K/mcL    RBC 3.76 (L) 4.20 -  5.40 M/mcL    Hemoglobin 11.2 (L) 12.0 - 16.0 g/dL    Hematocrit 33.0 (L) 37.0 - 47.0 %    MCV 87.8 82.0 - 98.0 fL    MCH 29.8 28.0 - 32.0 pg    MCHC 33.9 33.0 - 36.0 gm/dL    RDW-SD 49.1 40.0 - 54.0 fL    RDW-CV 15.4 (H) 12.0 - 15.0 %    RDW 15.4 (H) 12 - 15 %    Platelets 161 130 - 400 K/mcL   CBC (No diff)   Result Value Ref Range    WBC 10.62 4.80 - 10.80 K/mcL    RBC 4.00 (L) 4.20 - 5.40 M/mcL    Hemoglobin 11.8 (L) 12.0 - 16.0 g/dL    Hematocrit 34.7 (L) 37.0 - 47.0 %    MCV 86.8 82.0 - 98.0 fL    MCH 29.5 28.0 - 32.0 pg    MCHC 34.0 33.0 - 36.0 gm/dL    RDW-SD 45.9 40.0 - 54.0 fL    RDW-CV 15.0 12.0 - 15.0 %    RDW 15.0 12 - 15 %    Platelets 173 130 - 400 K/mcL   CBC (No diff)   Result Value Ref Range    WBC 11.76 (H) 4.80 - 10.80 K/mcL    RBC 4.12 (L) 4.20 - 5.40 M/mcL    Hemoglobin 12.1 12.0 - 16.0 g/dL    Hematocrit 34.9 (L) 37.0 - 47.0 %    MCV 84.7 82.0 - 98.0 fL    MCH 29.4 28.0 - 32.0 pg    MCHC 34.7 33.0 - 36.0 gm/dL    RDW-SD 43.3 40.0 - 54.0 fL    RDW-CV 14.3 12.0 - 15.0 %    RDW 14.3 12 - 15 %    Platelets 178 130 - 400 K/mcL   CBC and Differential   Result Value Ref Range    WBC 12.05 (H) 4.80 - 10.80 K/mcL    RBC 4.28 4.20 - 5.40 M/mcL    Hemoglobin 12.5 12.0 - 16.0 g/dL    Hematocrit 36.4 (L) 37.0 - 47.0 %    MCV 85.0 82.0 - 98.0 fL    MCH 29.2 28.0 - 32.0 pg    MCHC 34.3 33.0 - 36.0 gm/dL    RDW-SD 43.0 40.0 - 54.0 fL    RDW-CV 14.2 12.0 - 15.0 %    RDW 14.2 12 - 15 %    Platelets 201 130 - 400 K/mcL    Neutrophil Rel % 79.30 (H) 39.00 - 78.00 %    Lymphocyte Rel % 12.50 (L) 15.00 - 45.00 %    Monocyte Rel % 7.00 4.00 - 12.00 %    Eosinophil Rel % 0.70 0.00 - 4.00 %    Basophil Rel % 0.20 0.00 - 2.00 %    Neutrophils Absolute 9.56 (H) 1.87 - 8.40 K/mcL    Lymphocytes Absolute 1.51 0.72 - 4.86 K/mcL    Monocytes Absolute 0.84 0.19 - 1.30 K/mcL    Eosinophils Absolute 0.08 0.00 - 0.70 K/mcL    Basophils Absolute 0.02 0.00 - 0.20 K/mcL    Differential Type AUTO     Platelet Morphology Normal     POCT Glucose Fingerstick   Result Value Ref Range    Glucose 324 (H) 70 - 100 mg/dL   POCT Glucose Fingerstick   Result Value Ref Range    Glucose 247 (H) 70 - 100 mg/dL     *Note: Due to a large number of results and/or encounters for the requested time period, some results have not been displayed. A complete set of results can be found in Results Review.

## 2017-12-21 ENCOUNTER — HOSPITAL ENCOUNTER (INPATIENT)
Facility: HOSPITAL | Age: 82
LOS: 1 days | Discharge: HOME | End: 2017-12-22
Attending: FAMILY MEDICINE | Admitting: FAMILY MEDICINE

## 2017-12-21 DIAGNOSIS — E87.5 HYPERKALEMIA: Primary | ICD-10-CM

## 2017-12-21 DIAGNOSIS — R79.1 SUPRATHERAPEUTIC INR: ICD-10-CM

## 2017-12-21 DIAGNOSIS — N17.9 AKI (ACUTE KIDNEY INJURY) (HCC): ICD-10-CM

## 2017-12-21 LAB
ALBUMIN SERPL-MCNC: 3.9 G/DL (ref 3.5–5)
ALBUMIN/GLOB SERPL: 1.1 G/DL (ref 1.1–2.5)
ALP SERPL-CCNC: 66 U/L (ref 24–120)
ALT SERPL W P-5'-P-CCNC: 31 U/L (ref 0–54)
ANION GAP SERPL CALCULATED.3IONS-SCNC: 16 MMOL/L (ref 4–13)
APTT PPP: 112.5 SECONDS (ref 24.1–34.8)
AST SERPL-CCNC: 23 U/L (ref 7–45)
BASOPHILS # BLD AUTO: 0.04 10*3/MM3 (ref 0–0.2)
BASOPHILS NFR BLD AUTO: 0.3 % (ref 0–2)
BILIRUB SERPL-MCNC: 0.4 MG/DL (ref 0.1–1)
BILIRUB UR QL STRIP: NEGATIVE
BUN BLD-MCNC: 63 MG/DL (ref 5–21)
BUN/CREAT SERPL: 28.3 (ref 7–25)
CALCIUM SPEC-SCNC: 9.9 MG/DL (ref 8.4–10.4)
CHLORIDE SERPL-SCNC: 107 MMOL/L (ref 98–110)
CLARITY UR: CLEAR
CO2 SERPL-SCNC: 16 MMOL/L (ref 24–31)
COLOR UR: YELLOW
CREAT BLD-MCNC: 2.23 MG/DL (ref 0.5–1.4)
DEPRECATED RDW RBC AUTO: 50.8 FL (ref 40–54)
DIGOXIN SERPL-MCNC: 2.1 NG/ML (ref 0.8–2)
EOSINOPHIL # BLD AUTO: 0.08 10*3/MM3 (ref 0–0.7)
EOSINOPHIL NFR BLD AUTO: 0.6 % (ref 0–4)
ERYTHROCYTE [DISTWIDTH] IN BLOOD BY AUTOMATED COUNT: 15.9 % (ref 12–15)
GFR SERPL CREATININE-BSD FRML MDRD: 21 ML/MIN/1.73
GLOBULIN UR ELPH-MCNC: 3.6 GM/DL
GLUCOSE BLD-MCNC: 90 MG/DL (ref 70–100)
GLUCOSE BLDC GLUCOMTR-MCNC: 73 MG/DL (ref 70–130)
GLUCOSE BLDC GLUCOMTR-MCNC: 76 MG/DL (ref 70–130)
GLUCOSE UR STRIP-MCNC: NEGATIVE MG/DL
HCT VFR BLD AUTO: 33.9 % (ref 37–47)
HGB BLD-MCNC: 11.1 G/DL (ref 12–16)
HGB UR QL STRIP.AUTO: NEGATIVE
IMM GRANULOCYTES # BLD: 0.11 10*3/MM3 (ref 0–0.03)
IMM GRANULOCYTES NFR BLD: 0.8 % (ref 0–5)
INR PPP: 5.47 (ref 0.91–1.09)
KETONES UR QL STRIP: NEGATIVE
LEUKOCYTE ESTERASE UR QL STRIP.AUTO: NEGATIVE
LYMPHOCYTES # BLD AUTO: 2.57 10*3/MM3 (ref 0.72–4.86)
LYMPHOCYTES NFR BLD AUTO: 19.2 % (ref 15–45)
MCH RBC QN AUTO: 29.6 PG (ref 28–32)
MCHC RBC AUTO-ENTMCNC: 32.7 G/DL (ref 33–36)
MCV RBC AUTO: 90.4 FL (ref 82–98)
MONOCYTES # BLD AUTO: 0.86 10*3/MM3 (ref 0.19–1.3)
MONOCYTES NFR BLD AUTO: 6.4 % (ref 4–12)
NEUTROPHILS # BLD AUTO: 9.76 10*3/MM3 (ref 1.87–8.4)
NEUTROPHILS NFR BLD AUTO: 72.7 % (ref 39–78)
NITRITE UR QL STRIP: NEGATIVE
NRBC BLD MANUAL-RTO: 0 /100 WBC (ref 0–0)
PH UR STRIP.AUTO: <=5 [PH] (ref 5–8)
PLATELET # BLD AUTO: 256 10*3/MM3 (ref 130–400)
PMV BLD AUTO: 12.1 FL (ref 6–12)
POTASSIUM BLD-SCNC: 6.2 MMOL/L (ref 3.5–5.3)
PROT SERPL-MCNC: 7.5 G/DL (ref 6.3–8.7)
PROT UR QL STRIP: NEGATIVE
PROTHROMBIN TIME: 52 SECONDS (ref 11.9–14.6)
RBC # BLD AUTO: 3.75 10*6/MM3 (ref 4.2–5.4)
SODIUM BLD-SCNC: 139 MMOL/L (ref 135–145)
SP GR UR STRIP: 1.01 (ref 1–1.03)
TROPONIN I SERPL-MCNC: 0.03 NG/ML (ref 0–0.03)
UROBILINOGEN UR QL STRIP: NORMAL
WBC NRBC COR # BLD: 13.42 10*3/MM3 (ref 4.8–10.8)

## 2017-12-21 PROCEDURE — 25010000002 CEFTRIAXONE PER 250 MG: Performed by: FAMILY MEDICINE

## 2017-12-21 PROCEDURE — 94799 UNLISTED PULMONARY SVC/PX: CPT

## 2017-12-21 PROCEDURE — 93005 ELECTROCARDIOGRAM TRACING: CPT | Performed by: NURSE PRACTITIONER

## 2017-12-21 PROCEDURE — 85025 COMPLETE CBC W/AUTO DIFF WBC: CPT | Performed by: NURSE PRACTITIONER

## 2017-12-21 PROCEDURE — 84484 ASSAY OF TROPONIN QUANT: CPT | Performed by: NURSE PRACTITIONER

## 2017-12-21 PROCEDURE — 85610 PROTHROMBIN TIME: CPT | Performed by: NURSE PRACTITIONER

## 2017-12-21 PROCEDURE — 82962 GLUCOSE BLOOD TEST: CPT

## 2017-12-21 PROCEDURE — 63710000001 INSULIN REGULAR HUMAN PER 5 UNITS: Performed by: FAMILY MEDICINE

## 2017-12-21 PROCEDURE — 84132 ASSAY OF SERUM POTASSIUM: CPT | Performed by: NURSE PRACTITIONER

## 2017-12-21 PROCEDURE — 80053 COMPREHEN METABOLIC PANEL: CPT | Performed by: NURSE PRACTITIONER

## 2017-12-21 PROCEDURE — 81003 URINALYSIS AUTO W/O SCOPE: CPT | Performed by: NURSE PRACTITIONER

## 2017-12-21 PROCEDURE — 93010 ELECTROCARDIOGRAM REPORT: CPT | Performed by: INTERNAL MEDICINE

## 2017-12-21 PROCEDURE — 85730 THROMBOPLASTIN TIME PARTIAL: CPT | Performed by: NURSE PRACTITIONER

## 2017-12-21 PROCEDURE — 99284 EMERGENCY DEPT VISIT MOD MDM: CPT

## 2017-12-21 PROCEDURE — 25010000002 ONDANSETRON PER 1 MG: Performed by: NURSE PRACTITIONER

## 2017-12-21 PROCEDURE — 80162 ASSAY OF DIGOXIN TOTAL: CPT | Performed by: NURSE PRACTITIONER

## 2017-12-21 PROCEDURE — 87040 BLOOD CULTURE FOR BACTERIA: CPT | Performed by: FAMILY MEDICINE

## 2017-12-21 PROCEDURE — 93005 ELECTROCARDIOGRAM TRACING: CPT | Performed by: FAMILY MEDICINE

## 2017-12-21 PROCEDURE — 94640 AIRWAY INHALATION TREATMENT: CPT

## 2017-12-21 PROCEDURE — 25010000002 CALCIUM GLUCONATE PER 10 ML: Performed by: FAMILY MEDICINE

## 2017-12-21 RX ORDER — SODIUM CHLORIDE 0.9 % (FLUSH) 0.9 %
1-10 SYRINGE (ML) INJECTION AS NEEDED
Status: DISCONTINUED | OUTPATIENT
Start: 2017-12-21 | End: 2017-12-22 | Stop reason: HOSPADM

## 2017-12-21 RX ORDER — ONDANSETRON 2 MG/ML
4 INJECTION INTRAMUSCULAR; INTRAVENOUS EVERY 6 HOURS PRN
Status: DISCONTINUED | OUTPATIENT
Start: 2017-12-21 | End: 2017-12-22 | Stop reason: HOSPADM

## 2017-12-21 RX ORDER — CALCIUM GLUCONATE 94 MG/ML
1 INJECTION, SOLUTION INTRAVENOUS ONCE
Status: COMPLETED | OUTPATIENT
Start: 2017-12-21 | End: 2017-12-21

## 2017-12-21 RX ORDER — ACETAMINOPHEN 325 MG/1
650 TABLET ORAL EVERY 4 HOURS PRN
Status: DISCONTINUED | OUTPATIENT
Start: 2017-12-21 | End: 2017-12-22 | Stop reason: HOSPADM

## 2017-12-21 RX ORDER — SODIUM CHLORIDE 0.9 % (FLUSH) 0.9 %
10 SYRINGE (ML) INJECTION AS NEEDED
Status: DISCONTINUED | OUTPATIENT
Start: 2017-12-21 | End: 2017-12-22 | Stop reason: HOSPADM

## 2017-12-21 RX ORDER — ATORVASTATIN CALCIUM 40 MG/1
40 TABLET, FILM COATED ORAL DAILY
Status: DISCONTINUED | OUTPATIENT
Start: 2017-12-22 | End: 2017-12-22 | Stop reason: HOSPADM

## 2017-12-21 RX ORDER — DEXTROSE MONOHYDRATE 25 G/50ML
25 INJECTION, SOLUTION INTRAVENOUS
Status: DISCONTINUED | OUTPATIENT
Start: 2017-12-21 | End: 2017-12-22 | Stop reason: HOSPADM

## 2017-12-21 RX ORDER — ONDANSETRON 4 MG/1
4 TABLET, FILM COATED ORAL EVERY 6 HOURS PRN
Status: DISCONTINUED | OUTPATIENT
Start: 2017-12-21 | End: 2017-12-22 | Stop reason: HOSPADM

## 2017-12-21 RX ORDER — ALBUTEROL SULFATE 2.5 MG/3ML
2.5 SOLUTION RESPIRATORY (INHALATION)
Status: DISCONTINUED | OUTPATIENT
Start: 2017-12-21 | End: 2017-12-22 | Stop reason: HOSPADM

## 2017-12-21 RX ORDER — SODIUM CHLORIDE 9 MG/ML
75 INJECTION, SOLUTION INTRAVENOUS CONTINUOUS
Status: DISCONTINUED | OUTPATIENT
Start: 2017-12-21 | End: 2017-12-21

## 2017-12-21 RX ORDER — CARVEDILOL 6.25 MG/1
6.25 TABLET ORAL 2 TIMES DAILY WITH MEALS
Status: DISCONTINUED | OUTPATIENT
Start: 2017-12-21 | End: 2017-12-22 | Stop reason: HOSPADM

## 2017-12-21 RX ORDER — SODIUM CHLORIDE 9 MG/ML
75 INJECTION, SOLUTION INTRAVENOUS CONTINUOUS
Status: DISCONTINUED | OUTPATIENT
Start: 2017-12-21 | End: 2017-12-22 | Stop reason: HOSPADM

## 2017-12-21 RX ORDER — DEXTROSE MONOHYDRATE 25 G/50ML
50 INJECTION, SOLUTION INTRAVENOUS ONCE
Status: COMPLETED | OUTPATIENT
Start: 2017-12-21 | End: 2017-12-21

## 2017-12-21 RX ORDER — PANTOPRAZOLE SODIUM 40 MG/1
40 TABLET, DELAYED RELEASE ORAL
Status: DISCONTINUED | OUTPATIENT
Start: 2017-12-22 | End: 2017-12-22 | Stop reason: HOSPADM

## 2017-12-21 RX ORDER — ONDANSETRON 4 MG/1
4 TABLET, ORALLY DISINTEGRATING ORAL EVERY 6 HOURS PRN
Status: DISCONTINUED | OUTPATIENT
Start: 2017-12-21 | End: 2017-12-22 | Stop reason: HOSPADM

## 2017-12-21 RX ORDER — NICOTINE POLACRILEX 4 MG
15 LOZENGE BUCCAL
Status: DISCONTINUED | OUTPATIENT
Start: 2017-12-21 | End: 2017-12-22 | Stop reason: HOSPADM

## 2017-12-21 RX ORDER — LEVOTHYROXINE SODIUM 0.03 MG/1
25 TABLET ORAL
Status: DISCONTINUED | OUTPATIENT
Start: 2017-12-22 | End: 2017-12-22 | Stop reason: HOSPADM

## 2017-12-21 RX ORDER — UBIDECARENONE 75 MG
100 CAPSULE ORAL DAILY
COMMUNITY

## 2017-12-21 RX ADMIN — ALBUTEROL SULFATE 2.5 MG: 2.5 SOLUTION RESPIRATORY (INHALATION) at 20:51

## 2017-12-21 RX ADMIN — ONDANSETRON 4 MG: 2 INJECTION, SOLUTION INTRAMUSCULAR; INTRAVENOUS at 21:45

## 2017-12-21 RX ADMIN — CALCIUM GLUCONATE 1 G: 94 INJECTION, SOLUTION INTRAVENOUS at 18:22

## 2017-12-21 RX ADMIN — CARVEDILOL 6.25 MG: 6.25 TABLET, FILM COATED ORAL at 21:45

## 2017-12-21 RX ADMIN — SODIUM CHLORIDE 75 ML/HR: 9 INJECTION, SOLUTION INTRAVENOUS at 21:46

## 2017-12-21 RX ADMIN — SODIUM CHLORIDE 500 ML: 9 INJECTION, SOLUTION INTRAVENOUS at 16:48

## 2017-12-21 RX ADMIN — CEFTRIAXONE SODIUM 1 G: 1 INJECTION, POWDER, FOR SOLUTION INTRAMUSCULAR; INTRAVENOUS at 21:45

## 2017-12-21 RX ADMIN — DEXTROSE MONOHYDRATE 50 ML: 500 INJECTION PARENTERAL at 18:22

## 2017-12-21 RX ADMIN — INSULIN HUMAN 10 UNITS: 100 INJECTION, SOLUTION PARENTERAL at 18:22

## 2017-12-22 VITALS
RESPIRATION RATE: 24 BRPM | TEMPERATURE: 98.3 F | DIASTOLIC BLOOD PRESSURE: 66 MMHG | WEIGHT: 99.13 LBS | BODY MASS INDEX: 16.51 KG/M2 | HEART RATE: 92 BPM | SYSTOLIC BLOOD PRESSURE: 118 MMHG | OXYGEN SATURATION: 99 % | HEIGHT: 65 IN

## 2017-12-22 LAB
ALBUMIN SERPL-MCNC: 3.1 G/DL (ref 3.5–5)
ALBUMIN/GLOB SERPL: 1 G/DL (ref 1.1–2.5)
ALP SERPL-CCNC: 55 U/L (ref 24–120)
ALT SERPL W P-5'-P-CCNC: 39 U/L (ref 0–54)
ANION GAP SERPL CALCULATED.3IONS-SCNC: 13 MMOL/L (ref 4–13)
AST SERPL-CCNC: 33 U/L (ref 7–45)
BASOPHILS # BLD AUTO: 0.04 10*3/MM3 (ref 0–0.2)
BASOPHILS NFR BLD AUTO: 0.4 % (ref 0–2)
BILIRUB SERPL-MCNC: 0.3 MG/DL (ref 0.1–1)
BUN BLD-MCNC: 53 MG/DL (ref 5–21)
BUN/CREAT SERPL: 26 (ref 7–25)
CALCIUM SPEC-SCNC: 9.4 MG/DL (ref 8.4–10.4)
CHLORIDE SERPL-SCNC: 115 MMOL/L (ref 98–110)
CO2 SERPL-SCNC: 17 MMOL/L (ref 24–31)
CREAT BLD-MCNC: 2.04 MG/DL (ref 0.5–1.4)
DEPRECATED RDW RBC AUTO: 51.5 FL (ref 40–54)
DIGOXIN SERPL-MCNC: 0.9 NG/ML (ref 0.8–2)
EOSINOPHIL # BLD AUTO: 0.07 10*3/MM3 (ref 0–0.7)
EOSINOPHIL NFR BLD AUTO: 0.7 % (ref 0–4)
ERYTHROCYTE [DISTWIDTH] IN BLOOD BY AUTOMATED COUNT: 16.1 % (ref 12–15)
GFR SERPL CREATININE-BSD FRML MDRD: 23 ML/MIN/1.73
GLOBULIN UR ELPH-MCNC: 3.2 GM/DL
GLUCOSE BLD-MCNC: 80 MG/DL (ref 70–100)
HBA1C MFR BLD: 6.8 %
HCT VFR BLD AUTO: 29.5 % (ref 37–47)
HGB BLD-MCNC: 10 G/DL (ref 12–16)
IMM GRANULOCYTES # BLD: 0.09 10*3/MM3 (ref 0–0.03)
IMM GRANULOCYTES NFR BLD: 0.8 % (ref 0–5)
INR PPP: 6.05 (ref 0.91–1.09)
LYMPHOCYTES # BLD AUTO: 1.88 10*3/MM3 (ref 0.72–4.86)
LYMPHOCYTES NFR BLD AUTO: 17.6 % (ref 15–45)
MCH RBC QN AUTO: 30 PG (ref 28–32)
MCHC RBC AUTO-ENTMCNC: 33.9 G/DL (ref 33–36)
MCV RBC AUTO: 88.6 FL (ref 82–98)
MONOCYTES # BLD AUTO: 0.79 10*3/MM3 (ref 0.19–1.3)
MONOCYTES NFR BLD AUTO: 7.4 % (ref 4–12)
NEUTROPHILS # BLD AUTO: 7.8 10*3/MM3 (ref 1.87–8.4)
NEUTROPHILS NFR BLD AUTO: 73.1 % (ref 39–78)
NRBC BLD MANUAL-RTO: 0 /100 WBC (ref 0–0)
PLATELET # BLD AUTO: 211 10*3/MM3 (ref 130–400)
PMV BLD AUTO: 12.9 FL (ref 6–12)
POTASSIUM BLD-SCNC: 4.8 MMOL/L (ref 3.5–5.3)
POTASSIUM BLD-SCNC: 4.9 MMOL/L (ref 3.5–5.3)
PROT SERPL-MCNC: 6.3 G/DL (ref 6.3–8.7)
PROTHROMBIN TIME: 56.3 SECONDS (ref 11.9–14.6)
RBC # BLD AUTO: 3.33 10*6/MM3 (ref 4.2–5.4)
SODIUM BLD-SCNC: 145 MMOL/L (ref 135–145)
WBC NRBC COR # BLD: 10.67 10*3/MM3 (ref 4.8–10.8)

## 2017-12-22 PROCEDURE — 80053 COMPREHEN METABOLIC PANEL: CPT | Performed by: NURSE PRACTITIONER

## 2017-12-22 PROCEDURE — 83036 HEMOGLOBIN GLYCOSYLATED A1C: CPT | Performed by: NURSE PRACTITIONER

## 2017-12-22 PROCEDURE — 80162 ASSAY OF DIGOXIN TOTAL: CPT | Performed by: NURSE PRACTITIONER

## 2017-12-22 PROCEDURE — 94799 UNLISTED PULMONARY SVC/PX: CPT

## 2017-12-22 PROCEDURE — 87040 BLOOD CULTURE FOR BACTERIA: CPT | Performed by: FAMILY MEDICINE

## 2017-12-22 PROCEDURE — 85025 COMPLETE CBC W/AUTO DIFF WBC: CPT | Performed by: NURSE PRACTITIONER

## 2017-12-22 PROCEDURE — 85610 PROTHROMBIN TIME: CPT | Performed by: NURSE PRACTITIONER

## 2017-12-22 PROCEDURE — 94760 N-INVAS EAR/PLS OXIMETRY 1: CPT

## 2017-12-22 RX ADMIN — CARVEDILOL 6.25 MG: 6.25 TABLET, FILM COATED ORAL at 08:54

## 2017-12-22 RX ADMIN — ALBUTEROL SULFATE 2.5 MG: 2.5 SOLUTION RESPIRATORY (INHALATION) at 07:31

## 2017-12-22 RX ADMIN — DESMOPRESSIN ACETATE 40 MG: 0.2 TABLET ORAL at 08:54

## 2017-12-22 RX ADMIN — LEVOTHYROXINE SODIUM 25 MCG: 25 TABLET ORAL at 06:25

## 2017-12-22 RX ADMIN — ALBUTEROL SULFATE 2.5 MG: 2.5 SOLUTION RESPIRATORY (INHALATION) at 14:33

## 2017-12-22 RX ADMIN — PANTOPRAZOLE SODIUM 40 MG: 40 TABLET, DELAYED RELEASE ORAL at 06:25

## 2017-12-27 LAB
BACTERIA SPEC AEROBE CULT: NORMAL
BACTERIA SPEC AEROBE CULT: NORMAL

## 2018-01-05 ENCOUNTER — HOSPITAL ENCOUNTER (INPATIENT)
Age: 83
LOS: 7 days | Discharge: HOME | DRG: 871 | End: 2018-01-13
Attending: EMERGENCY MEDICINE | Admitting: INTERNAL MEDICINE
Payer: MEDICARE

## 2018-01-05 ENCOUNTER — APPOINTMENT (OUTPATIENT)
Dept: GENERAL RADIOLOGY | Age: 83
DRG: 871 | End: 2018-01-05
Payer: MEDICARE

## 2018-01-05 ENCOUNTER — APPOINTMENT (OUTPATIENT)
Dept: CT IMAGING | Age: 83
DRG: 871 | End: 2018-01-05
Payer: MEDICARE

## 2018-01-05 DIAGNOSIS — E87.20 LACTIC ACIDOSIS: ICD-10-CM

## 2018-01-05 DIAGNOSIS — R41.82 ALTERED MENTAL STATUS, UNSPECIFIED ALTERED MENTAL STATUS TYPE: Primary | ICD-10-CM

## 2018-01-05 DIAGNOSIS — I48.91 ATRIAL FIBRILLATION WITH RVR (HCC): ICD-10-CM

## 2018-01-05 DIAGNOSIS — E87.5 HYPERKALEMIA: ICD-10-CM

## 2018-01-05 DIAGNOSIS — A41.9 SEPSIS, DUE TO UNSPECIFIED ORGANISM: ICD-10-CM

## 2018-01-05 DIAGNOSIS — N17.9 AKI (ACUTE KIDNEY INJURY) (HCC): ICD-10-CM

## 2018-01-05 LAB
ALBUMIN SERPL-MCNC: 3.6 G/DL (ref 3.5–5.2)
ALP BLD-CCNC: 71 U/L (ref 35–104)
ALT SERPL-CCNC: 19 U/L (ref 5–33)
ANION GAP SERPL CALCULATED.3IONS-SCNC: 22 MMOL/L (ref 7–19)
APTT: 32.2 SEC (ref 26–36.2)
AST SERPL-CCNC: 39 U/L (ref 5–32)
BASE EXCESS ARTERIAL: -10.3 MMOL/L (ref -2–2)
BASOPHILS ABSOLUTE: 0.1 K/UL (ref 0–0.2)
BASOPHILS RELATIVE PERCENT: 0.3 % (ref 0–1)
BILIRUB SERPL-MCNC: 0.5 MG/DL (ref 0.2–1.2)
BILIRUBIN URINE: NEGATIVE
BLOOD, URINE: NEGATIVE
BUN BLDV-MCNC: 50 MG/DL (ref 8–23)
CALCIUM SERPL-MCNC: 7.8 MG/DL (ref 8.2–9.6)
CARBOXYHEMOGLOBIN ARTERIAL: 2.1 % (ref 0–5)
CHLORIDE BLD-SCNC: 92 MMOL/L (ref 98–111)
CLARITY: ABNORMAL
CO2: 19 MMOL/L (ref 22–29)
COLOR: YELLOW
CREAT SERPL-MCNC: 2.2 MG/DL (ref 0.5–0.9)
EOSINOPHILS ABSOLUTE: 0 K/UL (ref 0–0.6)
EOSINOPHILS RELATIVE PERCENT: 0.2 % (ref 0–5)
GFR NON-AFRICAN AMERICAN: 21
GLUCOSE BLD-MCNC: 206 MG/DL (ref 70–99)
GLUCOSE BLD-MCNC: 211 MG/DL (ref 70–99)
GLUCOSE BLD-MCNC: 252 MG/DL (ref 74–109)
GLUCOSE URINE: NEGATIVE MG/DL
HCO3 ARTERIAL: 13.6 MMOL/L (ref 22–26)
HCT VFR BLD CALC: 33.3 % (ref 37–47)
HEMOGLOBIN, ART, EXTENDED: 10.4 G/DL (ref 12–16)
HEMOGLOBIN: 10.3 G/DL (ref 12–16)
INR BLD: 1.18 (ref 0.88–1.18)
KETONES, URINE: NEGATIVE MG/DL
LACTIC ACID: 10.4 MMOL/L (ref 0.5–1.9)
LEUKOCYTE ESTERASE, URINE: NEGATIVE
LYMPHOCYTES ABSOLUTE: 3.4 K/UL (ref 1.1–4.5)
LYMPHOCYTES RELATIVE PERCENT: 22 % (ref 20–40)
MCH RBC QN AUTO: 29.8 PG (ref 27–31)
MCHC RBC AUTO-ENTMCNC: 30.9 G/DL (ref 33–37)
MCV RBC AUTO: 96.2 FL (ref 81–99)
METHEMOGLOBIN ARTERIAL: 0.6 %
MONOCYTES ABSOLUTE: 0.6 K/UL (ref 0–0.9)
MONOCYTES RELATIVE PERCENT: 4.1 % (ref 0–10)
NEUTROPHILS ABSOLUTE: 11.3 K/UL (ref 1.5–7.5)
NEUTROPHILS RELATIVE PERCENT: 72.9 % (ref 50–65)
NITRITE, URINE: NEGATIVE
O2 CONTENT ARTERIAL: 14.1 ML/DL
O2 SAT, ARTERIAL: 95.8 %
O2 THERAPY: ABNORMAL
PCO2 ARTERIAL: 24 MMHG (ref 35–45)
PDW BLD-RTO: 15.7 % (ref 11.5–14.5)
PERFORMED ON: ABNORMAL
PERFORMED ON: ABNORMAL
PERFORMED ON: NORMAL
PH ARTERIAL: 7.36 (ref 7.35–7.45)
PH UA: 5.5
PLATELET # BLD: 270 K/UL (ref 130–400)
PMV BLD AUTO: 12.7 FL (ref 9.4–12.3)
PO2 ARTERIAL: 80 MMHG (ref 80–100)
POC TROPONIN I: 0 NG/ML (ref 0–0.08)
POTASSIUM SERPL-SCNC: 6.3 MMOL/L (ref 3.5–5)
POTASSIUM, WHOLE BLOOD: 5.6
PRO-BNP: ABNORMAL PG/ML (ref 0–1800)
PROTEIN UA: ABNORMAL MG/DL
PROTHROMBIN TIME: 15 SEC (ref 12–14.6)
RBC # BLD: 3.46 M/UL (ref 4.2–5.4)
SODIUM BLD-SCNC: 133 MMOL/L (ref 136–145)
SPECIFIC GRAVITY UA: 1.01
TOTAL PROTEIN: 6.9 G/DL (ref 6.6–8.7)
UROBILINOGEN, URINE: 0.2 E.U./DL
WBC # BLD: 15.5 K/UL (ref 4.8–10.8)

## 2018-01-05 PROCEDURE — 85610 PROTHROMBIN TIME: CPT

## 2018-01-05 PROCEDURE — 83880 ASSAY OF NATRIURETIC PEPTIDE: CPT

## 2018-01-05 PROCEDURE — 84132 ASSAY OF SERUM POTASSIUM: CPT

## 2018-01-05 PROCEDURE — 36600 WITHDRAWAL OF ARTERIAL BLOOD: CPT

## 2018-01-05 PROCEDURE — 36415 COLL VENOUS BLD VENIPUNCTURE: CPT

## 2018-01-05 PROCEDURE — 85730 THROMBOPLASTIN TIME PARTIAL: CPT

## 2018-01-05 PROCEDURE — 2500000003 HC RX 250 WO HCPCS: Performed by: EMERGENCY MEDICINE

## 2018-01-05 PROCEDURE — 70450 CT HEAD/BRAIN W/O DYE: CPT

## 2018-01-05 PROCEDURE — 82948 REAGENT STRIP/BLOOD GLUCOSE: CPT

## 2018-01-05 PROCEDURE — 96365 THER/PROPH/DIAG IV INF INIT: CPT

## 2018-01-05 PROCEDURE — 99285 EMERGENCY DEPT VISIT HI MDM: CPT

## 2018-01-05 PROCEDURE — 84484 ASSAY OF TROPONIN QUANT: CPT

## 2018-01-05 PROCEDURE — 6360000002 HC RX W HCPCS: Performed by: EMERGENCY MEDICINE

## 2018-01-05 PROCEDURE — 6370000000 HC RX 637 (ALT 250 FOR IP): Performed by: EMERGENCY MEDICINE

## 2018-01-05 PROCEDURE — 82803 BLOOD GASES ANY COMBINATION: CPT

## 2018-01-05 PROCEDURE — 85025 COMPLETE CBC W/AUTO DIFF WBC: CPT

## 2018-01-05 PROCEDURE — 80053 COMPREHEN METABOLIC PANEL: CPT

## 2018-01-05 PROCEDURE — 2580000003 HC RX 258: Performed by: EMERGENCY MEDICINE

## 2018-01-05 PROCEDURE — 93005 ELECTROCARDIOGRAM TRACING: CPT

## 2018-01-05 PROCEDURE — 71045 X-RAY EXAM CHEST 1 VIEW: CPT

## 2018-01-05 PROCEDURE — 99285 EMERGENCY DEPT VISIT HI MDM: CPT | Performed by: EMERGENCY MEDICINE

## 2018-01-05 PROCEDURE — 96375 TX/PRO/DX INJ NEW DRUG ADDON: CPT

## 2018-01-05 RX ORDER — DEXTROSE MONOHYDRATE 25 G/50ML
25 INJECTION, SOLUTION INTRAVENOUS ONCE
Status: COMPLETED | OUTPATIENT
Start: 2018-01-05 | End: 2018-01-06

## 2018-01-05 RX ORDER — CALCIUM CHLORIDE 100 MG/ML
1 INJECTION INTRAVENOUS; INTRAVENTRICULAR ONCE
Status: COMPLETED | OUTPATIENT
Start: 2018-01-05 | End: 2018-01-05

## 2018-01-05 RX ORDER — 0.9 % SODIUM CHLORIDE 0.9 %
500 INTRAVENOUS SOLUTION INTRAVENOUS ONCE
Status: COMPLETED | OUTPATIENT
Start: 2018-01-05 | End: 2018-01-06

## 2018-01-05 RX ORDER — DILTIAZEM HYDROCHLORIDE 5 MG/ML
10 INJECTION INTRAVENOUS ONCE
Status: COMPLETED | OUTPATIENT
Start: 2018-01-05 | End: 2018-01-05

## 2018-01-05 RX ORDER — LEVOFLOXACIN 5 MG/ML
750 INJECTION, SOLUTION INTRAVENOUS ONCE
Status: DISCONTINUED | OUTPATIENT
Start: 2018-01-05 | End: 2018-01-10

## 2018-01-05 RX ADMIN — DILTIAZEM HYDROCHLORIDE 10 MG: 5 INJECTION INTRAVENOUS at 22:31

## 2018-01-05 RX ADMIN — SODIUM CHLORIDE 500 ML: 9 INJECTION, SOLUTION INTRAVENOUS at 23:58

## 2018-01-05 RX ADMIN — TAZOBACTAM SODIUM AND PIPERACILLIN SODIUM 3.38 G: 375; 3 INJECTION, SOLUTION INTRAVENOUS at 23:59

## 2018-01-05 RX ADMIN — SODIUM BICARBONATE 50 MEQ: 84 INJECTION INTRAVENOUS at 23:48

## 2018-01-05 RX ADMIN — INSULIN HUMAN 10 UNITS: 100 INJECTION, SOLUTION PARENTERAL at 23:37

## 2018-01-05 RX ADMIN — CALCIUM CHLORIDE 1 G: 100 INJECTION INTRAVENOUS; INTRAVENTRICULAR at 23:41

## 2018-01-05 RX ADMIN — DILTIAZEM HYDROCHLORIDE 5 MG/HR: 5 INJECTION INTRAVENOUS at 22:33

## 2018-01-05 ASSESSMENT — ENCOUNTER SYMPTOMS: SHORTNESS OF BREATH: 1

## 2018-01-06 ENCOUNTER — APPOINTMENT (OUTPATIENT)
Dept: CT IMAGING | Age: 83
DRG: 871 | End: 2018-01-06
Payer: MEDICARE

## 2018-01-06 LAB
ALBUMIN SERPL-MCNC: 3.4 G/DL (ref 3.5–5.2)
ALP BLD-CCNC: 67 U/L (ref 35–104)
ALT SERPL-CCNC: 28 U/L (ref 5–33)
ANION GAP SERPL CALCULATED.3IONS-SCNC: 22 MMOL/L (ref 7–19)
AST SERPL-CCNC: 47 U/L (ref 5–32)
BASE EXCESS ARTERIAL: -6.6 MMOL/L (ref -2–2)
BASOPHILS ABSOLUTE: 0 K/UL (ref 0–0.2)
BASOPHILS RELATIVE PERCENT: 0.2 % (ref 0–1)
BILIRUB SERPL-MCNC: 0.7 MG/DL (ref 0.2–1.2)
BILIRUBIN DIRECT: 0.3 MG/DL (ref 0–0.3)
BILIRUBIN, INDIRECT: 0.4 MG/DL (ref 0.1–1)
BUN BLDV-MCNC: 52 MG/DL (ref 8–23)
CALCIUM IONIZED: 1.09 MMOL/L (ref 1.12–1.32)
CALCIUM SERPL-MCNC: 8.9 MG/DL (ref 8.2–9.6)
CARBOXYHEMOGLOBIN ARTERIAL: 2.4 % (ref 0–5)
CHLORIDE BLD-SCNC: 96 MMOL/L (ref 98–111)
CO2: 18 MMOL/L (ref 22–29)
CREAT SERPL-MCNC: 2.3 MG/DL (ref 0.5–0.9)
EOSINOPHILS ABSOLUTE: 0 K/UL (ref 0–0.6)
EOSINOPHILS RELATIVE PERCENT: 0 % (ref 0–5)
GFR NON-AFRICAN AMERICAN: 20
GLUCOSE BLD-MCNC: 179 MG/DL (ref 74–109)
GLUCOSE BLD-MCNC: 235 MG/DL (ref 70–99)
HCO3 ARTERIAL: 16.3 MMOL/L (ref 22–26)
HCT VFR BLD CALC: 31.5 % (ref 37–47)
HEMOGLOBIN, ART, EXTENDED: 9.8 G/DL (ref 12–16)
HEMOGLOBIN: 9.7 G/DL (ref 12–16)
LACTIC ACID: 3.6 MMOL/L (ref 0.5–1.9)
LACTIC ACID: 7.3 MMOL/L (ref 0.5–1.9)
LYMPHOCYTES ABSOLUTE: 1.1 K/UL (ref 1.1–4.5)
LYMPHOCYTES RELATIVE PERCENT: 7.2 % (ref 20–40)
MAGNESIUM: 0.6 MG/DL (ref 1.7–2.3)
MCH RBC QN AUTO: 29.3 PG (ref 27–31)
MCHC RBC AUTO-ENTMCNC: 30.8 G/DL (ref 33–37)
MCV RBC AUTO: 95.2 FL (ref 81–99)
METHEMOGLOBIN ARTERIAL: 0.6 %
MONOCYTES ABSOLUTE: 0.5 K/UL (ref 0–0.9)
MONOCYTES RELATIVE PERCENT: 3.2 % (ref 0–10)
NEUTROPHILS ABSOLUTE: 13.7 K/UL (ref 1.5–7.5)
NEUTROPHILS RELATIVE PERCENT: 88.7 % (ref 50–65)
O2 CONTENT ARTERIAL: 13.4 ML/DL
O2 SAT, ARTERIAL: 96.6 %
O2 THERAPY: ABNORMAL
PCO2 ARTERIAL: 24 MMHG (ref 35–45)
PDW BLD-RTO: 15 % (ref 11.5–14.5)
PERFORMED ON: ABNORMAL
PH ARTERIAL: 7.44 (ref 7.35–7.45)
PHOSPHORUS: 2.1 MG/DL (ref 2.5–4.5)
PLATELET # BLD: 221 K/UL (ref 130–400)
PMV BLD AUTO: 12.4 FL (ref 9.4–12.3)
PO2 ARTERIAL: 84 MMHG (ref 80–100)
POTASSIUM SERPL-SCNC: 5.5 MMOL/L (ref 3.5–5)
POTASSIUM, WHOLE BLOOD: 5.1
RBC # BLD: 3.31 M/UL (ref 4.2–5.4)
SODIUM BLD-SCNC: 136 MMOL/L (ref 136–145)
TOTAL PROTEIN: 6.5 G/DL (ref 6.6–8.7)
WBC # BLD: 15.4 K/UL (ref 4.8–10.8)

## 2018-01-06 PROCEDURE — 6360000002 HC RX W HCPCS: Performed by: HOSPITALIST

## 2018-01-06 PROCEDURE — 6360000002 HC RX W HCPCS: Performed by: EMERGENCY MEDICINE

## 2018-01-06 PROCEDURE — 36600 WITHDRAWAL OF ARTERIAL BLOOD: CPT

## 2018-01-06 PROCEDURE — 6370000000 HC RX 637 (ALT 250 FOR IP): Performed by: INTERNAL MEDICINE

## 2018-01-06 PROCEDURE — 2580000003 HC RX 258: Performed by: INTERNAL MEDICINE

## 2018-01-06 PROCEDURE — 2000000000 HC ICU R&B

## 2018-01-06 PROCEDURE — 2580000003 HC RX 258: Performed by: HOSPITALIST

## 2018-01-06 PROCEDURE — 85025 COMPLETE CBC W/AUTO DIFF WBC: CPT

## 2018-01-06 PROCEDURE — 2700000000 HC OXYGEN THERAPY PER DAY

## 2018-01-06 PROCEDURE — 6360000002 HC RX W HCPCS: Performed by: INTERNAL MEDICINE

## 2018-01-06 PROCEDURE — 82803 BLOOD GASES ANY COMBINATION: CPT

## 2018-01-06 PROCEDURE — 80048 BASIC METABOLIC PNL TOTAL CA: CPT

## 2018-01-06 PROCEDURE — 80076 HEPATIC FUNCTION PANEL: CPT

## 2018-01-06 PROCEDURE — 87040 BLOOD CULTURE FOR BACTERIA: CPT

## 2018-01-06 PROCEDURE — 2580000003 HC RX 258: Performed by: EMERGENCY MEDICINE

## 2018-01-06 PROCEDURE — 81003 URINALYSIS AUTO W/O SCOPE: CPT

## 2018-01-06 PROCEDURE — 84100 ASSAY OF PHOSPHORUS: CPT

## 2018-01-06 PROCEDURE — 82330 ASSAY OF CALCIUM: CPT

## 2018-01-06 PROCEDURE — 84132 ASSAY OF SERUM POTASSIUM: CPT

## 2018-01-06 PROCEDURE — 83605 ASSAY OF LACTIC ACID: CPT

## 2018-01-06 PROCEDURE — 82948 REAGENT STRIP/BLOOD GLUCOSE: CPT

## 2018-01-06 PROCEDURE — 99223 1ST HOSP IP/OBS HIGH 75: CPT | Performed by: INTERNAL MEDICINE

## 2018-01-06 PROCEDURE — 83735 ASSAY OF MAGNESIUM: CPT

## 2018-01-06 PROCEDURE — 2500000003 HC RX 250 WO HCPCS: Performed by: EMERGENCY MEDICINE

## 2018-01-06 PROCEDURE — 74176 CT ABD & PELVIS W/O CONTRAST: CPT

## 2018-01-06 RX ORDER — NICOTINE POLACRILEX 4 MG
15 LOZENGE BUCCAL PRN
Status: DISCONTINUED | OUTPATIENT
Start: 2018-01-06 | End: 2018-01-13 | Stop reason: HOSPADM

## 2018-01-06 RX ORDER — ACETAMINOPHEN 325 MG/1
650 TABLET ORAL EVERY 4 HOURS PRN
Status: DISCONTINUED | OUTPATIENT
Start: 2018-01-06 | End: 2018-01-13 | Stop reason: HOSPADM

## 2018-01-06 RX ORDER — SODIUM CHLORIDE 0.9 % (FLUSH) 0.9 %
10 SYRINGE (ML) INJECTION PRN
Status: DISCONTINUED | OUTPATIENT
Start: 2018-01-06 | End: 2018-01-13 | Stop reason: HOSPADM

## 2018-01-06 RX ORDER — DEXTROSE MONOHYDRATE 25 G/50ML
12.5 INJECTION, SOLUTION INTRAVENOUS PRN
Status: DISCONTINUED | OUTPATIENT
Start: 2018-01-06 | End: 2018-01-13 | Stop reason: HOSPADM

## 2018-01-06 RX ORDER — CARVEDILOL 6.25 MG/1
6.25 TABLET ORAL 2 TIMES DAILY
Status: DISCONTINUED | OUTPATIENT
Start: 2018-01-06 | End: 2018-01-11

## 2018-01-06 RX ORDER — SODIUM CHLORIDE 9 MG/ML
INJECTION, SOLUTION INTRAVENOUS CONTINUOUS
Status: DISCONTINUED | OUTPATIENT
Start: 2018-01-06 | End: 2018-01-10

## 2018-01-06 RX ORDER — DEXTROSE MONOHYDRATE 50 MG/ML
100 INJECTION, SOLUTION INTRAVENOUS PRN
Status: DISCONTINUED | OUTPATIENT
Start: 2018-01-06 | End: 2018-01-13 | Stop reason: HOSPADM

## 2018-01-06 RX ORDER — PANTOPRAZOLE SODIUM 40 MG/1
40 GRANULE, DELAYED RELEASE ORAL
Status: DISCONTINUED | OUTPATIENT
Start: 2018-01-06 | End: 2018-01-13 | Stop reason: HOSPADM

## 2018-01-06 RX ORDER — MAGNESIUM SULFATE IN WATER 40 MG/ML
2 INJECTION, SOLUTION INTRAVENOUS ONCE
Status: COMPLETED | OUTPATIENT
Start: 2018-01-06 | End: 2018-01-06

## 2018-01-06 RX ORDER — SODIUM CHLORIDE 0.9 % (FLUSH) 0.9 %
10 SYRINGE (ML) INJECTION EVERY 12 HOURS SCHEDULED
Status: DISCONTINUED | OUTPATIENT
Start: 2018-01-06 | End: 2018-01-13 | Stop reason: HOSPADM

## 2018-01-06 RX ORDER — ATORVASTATIN CALCIUM 40 MG/1
40 TABLET, FILM COATED ORAL DAILY
Status: DISCONTINUED | OUTPATIENT
Start: 2018-01-06 | End: 2018-01-13 | Stop reason: HOSPADM

## 2018-01-06 RX ORDER — LEVOTHYROXINE SODIUM 0.03 MG/1
25 TABLET ORAL 2 TIMES DAILY
Status: DISCONTINUED | OUTPATIENT
Start: 2018-01-06 | End: 2018-01-13 | Stop reason: HOSPADM

## 2018-01-06 RX ORDER — INSULIN GLARGINE 100 [IU]/ML
5 INJECTION, SOLUTION SUBCUTANEOUS NIGHTLY
Status: DISCONTINUED | OUTPATIENT
Start: 2018-01-06 | End: 2018-01-13 | Stop reason: HOSPADM

## 2018-01-06 RX ORDER — ASPIRIN 81 MG/1
81 TABLET, CHEWABLE ORAL DAILY
Status: DISCONTINUED | OUTPATIENT
Start: 2018-01-06 | End: 2018-01-13 | Stop reason: HOSPADM

## 2018-01-06 RX ADMIN — MAGNESIUM SULFATE HEPTAHYDRATE 2 G: 40 INJECTION, SOLUTION INTRAVENOUS at 06:41

## 2018-01-06 RX ADMIN — SODIUM CHLORIDE: 9 INJECTION, SOLUTION INTRAVENOUS at 03:30

## 2018-01-06 RX ADMIN — DILTIAZEM HYDROCHLORIDE 5 MG/HR: 5 INJECTION INTRAVENOUS at 14:32

## 2018-01-06 RX ADMIN — ENOXAPARIN SODIUM 30 MG: 100 INJECTION SUBCUTANEOUS at 10:33

## 2018-01-06 RX ADMIN — MEROPENEM 0.5 G: 500 INJECTION, POWDER, FOR SOLUTION INTRAVENOUS at 11:20

## 2018-01-06 RX ADMIN — VANCOMYCIN HYDROCHLORIDE 750 MG: 1 INJECTION, POWDER, LYOPHILIZED, FOR SOLUTION INTRAVENOUS at 00:29

## 2018-01-06 RX ADMIN — MEROPENEM 0.5 G: 500 INJECTION, POWDER, FOR SOLUTION INTRAVENOUS at 23:25

## 2018-01-06 RX ADMIN — Medication 10 ML: at 20:41

## 2018-01-06 RX ADMIN — SODIUM CHLORIDE: 9 INJECTION, SOLUTION INTRAVENOUS at 10:38

## 2018-01-06 RX ADMIN — INSULIN GLARGINE 5 UNITS: 100 INJECTION, SOLUTION SUBCUTANEOUS at 20:39

## 2018-01-06 RX ADMIN — DEXTROSE MONOHYDRATE 25 G: 500 INJECTION PARENTERAL at 00:07

## 2018-01-06 ASSESSMENT — PAIN SCALES - PAIN ASSESSMENT IN ADVANCED DEMENTIA (PAINAD)
TOTALSCORE: 4
FACIALEXPRESSION: 1
FACIALEXPRESSION: 2
FACIALEXPRESSION: 1
CONSOLABILITY: 0
NEGVOCALIZATION: 1
BREATHING: 1
CONSOLABILITY: 0
NEGVOCALIZATION: 1
CONSOLABILITY: 1
BREATHING: 1
BREATHING: 1
CONSOLABILITY: 2
BREATHING: 1
FACIALEXPRESSION: 1
BODYLANGUAGE: 2
TOTALSCORE: 9
BODYLANGUAGE: 2
BODYLANGUAGE: 1
NEGVOCALIZATION: 1
BODYLANGUAGE: 0
TOTALSCORE: 6
TOTALSCORE: 3
NEGVOCALIZATION: 2

## 2018-01-06 NOTE — ED PROVIDER NOTES
daily    ATORVASTATIN (LIPITOR) 40 MG TABLET    Take 40 mg by mouth daily    CARVEDILOL (COREG) 12.5 MG TABLET    Take 0.5 tablets by mouth 2 times daily    FUROSEMIDE (LASIX) 40 MG TABLET    Take 0.5 tablets by mouth daily    INSULIN GLARGINE (LANTUS) 100 UNIT/ML INJECTION VIAL    Inject 5 Units into the skin nightly    LEVOTHYROXINE (SYNTHROID) 25 MCG TABLET    Take 25 mcg by mouth 2 times daily    METFORMIN (GLUCOPHAGE) 850 MG TABLET    Take 850 mg by mouth 2 times daily (with meals)    PANTOPRAZOLE SODIUM (PROTONIX) 40 MG PACK PACKET    Take 40 mg by mouth every morning (before breakfast)       ALLERGIES     Review of patient's allergies indicates no known allergies. FAMILY HISTORY     History reviewed. No pertinent family history. SOCIAL HISTORY       Social History     Social History    Marital status:      Spouse name: N/A    Number of children: N/A    Years of education: N/A     Social History Main Topics    Smoking status: Never Smoker    Smokeless tobacco: Never Used    Alcohol use No    Drug use: No    Sexual activity: Not Asked     Other Topics Concern    None     Social History Narrative    None       SCREENINGS             PHYSICAL EXAM    (up to 7 for level 4, 8 or more for level 5)     ED Triage Vitals [01/05/18 2157]   BP Temp Temp Source Pulse Resp SpO2 Height Weight   (!) 110/91 97.8 °F (36.6 °C) Temporal 158 18 -- 5' 2\" (1.575 m) 90 lb (40.8 kg)       Physical Exam   Constitutional: She appears well-developed and well-nourished. She appears lethargic. No distress. Cachectic   HENT:   Head: Normocephalic and atraumatic. Eyes: Pupils are equal, round, and reactive to light. Neck: Normal range of motion. Neck supple. JVD present. Cardiovascular: Normal heart sounds and intact distal pulses. An irregularly irregular rhythm present. Tachycardia present. Pulmonary/Chest: Effort normal and breath sounds normal. Tachypnea noted. No respiratory distress.    Abdominal:

## 2018-01-06 NOTE — PROGRESS NOTES
Pharmacy Note  Vancomycin Consult    Chilango England is a 80 y.o. female started on Vancomycin; consult received from Dr. Chappell Members to manage therapy. Patient Active Problem List   Diagnosis    ST elevation myocardial infarction involving left anterior descending (LAD) coronary artery (HCC)    Aspiration pneumonia of right lung due to gastric secretions ([facility])    Sepsis ([facility]    Late onset Alzheimer's disease without behavioral disturbance    Acute on chronic combined systolic and diastolic heart failure ([facility])    Type 2 diabetes mellitus without complication ([facility] [facility]    LYDIA (acute kidney injury) ([facility] [facility]    Hyperkalemia    Encephalopathy    Elevated troponin    Hypokalemia    Altered mental status    Iron deficiency anemia    Acute GI bleeding    Chronic atrial fibrillation ([facility])       Allergies:  Review of patient's allergies indicates no known allergies. Temp max: 97.8    Recent Labs      01/05/18 2226   BUN  50*       Recent Labs      01/05/18 2226   CREATININE  2.2*       Recent Labs      01/05/18 2226   WBC  15.5*       No intake or output data in the 24 hours ending 01/06/18 0241    No current cultures at this time  Culture Date Source Results                      Ht Readings from Last 1 Encounters:   01/05/18 5' 2\" (1.575 m)        Wt Readings from Last 1 Encounters:   01/05/18 90 lb (40.8 kg)         Body mass index is 16.46 kg/m². CrCl cannot be calculated (Unknown ideal weight. ). Assessment/Plan:  Will initiate vancomycin as Pulse dosing due to decline in renal function. Timing of trough level will be determined based on culture results, renal function, and clinical response. Thank you for the consult. Will continue to follow.     Electronically signed by Anne Conklin, [address] [place] [address] on 1/6/2018 at 2:41 AM

## 2018-01-06 NOTE — CONSULTS
glargine (LANTUS) injection vial 5 Units, 5 Units, Subcutaneous, Nightly  carvedilol (COREG) tablet 6.25 mg, 6.25 mg, Oral, BID  sodium chloride flush 0.9 % injection 10 mL, 10 mL, Intravenous, 2 times per day  sodium chloride flush 0.9 % injection 10 mL, 10 mL, Intravenous, PRN  acetaminophen (TYLENOL) tablet 650 mg, 650 mg, Oral, Q4H PRN  glucose (GLUTOSE) 40 % oral gel 15 g, 15 g, Oral, PRN  dextrose 50 % solution 12.5 g, 12.5 g, Intravenous, PRN  glucagon (rDNA) injection 1 mg, 1 mg, Intramuscular, PRN  dextrose 5 % solution, 100 mL/hr, Intravenous, PRN  0.9 % sodium chloride infusion, , Intravenous, Continuous  enoxaparin (LOVENOX) injection 30 mg, 30 mg, Subcutaneous, Daily  meropenem (MERREM) 0.5 g in dextrose 5 % 100 mL IVPB, 0.5 g, Intravenous, Q12H  diltiazem 125 mg in dextrose 5 % 125 mL infusion, 5 mg/hr, Intravenous, Continuous  levofloxacin (LEVAQUIN) 750 MG/150ML infusion 750 mg, 750 mg, Intravenous, Once  vancomycin (VANCOCIN) intermittent dosing (placeholder), , Other, RX Placeholder  Allergies:  Review of patient's allergies indicates no known allergies. Social History:   TOBACCO:   reports that she has never smoked. She has never used smokeless tobacco.  ETOH:   reports that she does not drink alcohol. Family History:   History reviewed. No pertinent family history.     REVIEW OF SYSTEMS:    Review of systems not obtained due to patient factors - mental status    PHYSICAL EXAM:    VITALS:  /69   Pulse 100   Temp 99.3 °F (37.4 °C) (Temporal)   Resp 25   Ht 5' 2\" (1.575 m)   Wt 105 lb 11.2 oz (47.9 kg)   BMI 19.33 kg/m²   CONSTITUTIONAL:  Eyes open, but not following commands, non-verbal  NECK:  supple, symmetrical, trachea midline  LUNGS:  No increased work of breathing, good air exchange, clear to auscultation bilaterally, no crackles or wheezing  CARDIOVASCULAR:  Normal apical impulse, irregular rate and rhythm, and no murmur noted  CHEST : Chest wall non-tender  MUSCULOSKELETAL: operation. They agree and they don't want the patient to have surgery either. Continue with current management.

## 2018-01-06 NOTE — H&P
Lilliam Tavarez MD   atorvastatin (LIPITOR) 40 MG tablet Take 40 mg by mouth daily   Yes Historical Provider, MD   metFORMIN (GLUCOPHAGE) 850 MG tablet Take 850 mg by mouth 2 times daily (with meals)   Yes Historical Provider, MD   pantoprazole sodium (PROTONIX) 40 MG PACK packet Take 40 mg by mouth every morning (before breakfast)   Yes Historical Provider, MD   levothyroxine (SYNTHROID) 25 MCG tablet Take 25 mcg by mouth 2 times daily   Yes Historical Provider, MD       Allergies:    Review of patient's allergies indicates no known allergies. Social History:    The patient currently lives at home  Tobacco:   reports that she has never smoked. She has never used smokeless tobacco.  Alcohol:   reports that she does not drink alcohol. Illicit Drugs:     Family History:    Reviewed in detail and negative for DM, CAD, Cancer, CVA. Positive as follows:  History reviewed. No pertinent family history. Review of Systems:   Review of systems not obtained due to patient factors. Physical Examination:  General Appearance:  awake, alert, oriented, in no acute distress  Skin:  Skin color, texture, turgor normal. No rashes or lesions. Eyes:  No gross abnormalities. Neck:  neck- supple, no mass, non-tender  Lungs:  Normal expansion. Clear to auscultation. Mild rales  Heart:  Heart sounds are normal.  Regular rate and rhythm without murmur, gallop or rub. Abdomen:  Firm marked-tenderness with guarding, decreased bowel sounds. No bruits, organomegaly or masses. Musculoskeletal:  In contracture posture difficult to examine  Neurologic:  Alert and responds to verbal commands.  No gross focal signs    Diagnostic Data:  CXR:  I have reviewed the report with the following interpretation: minimal interstitial changes  EKG:  I have reviewed the EKG with the following interpretation: pending  LABS:   CBC:  Recent Labs      01/05/18 2226   WBC  15.5*   HGB  10.3*   HCT  33.3*   PLT  270     BMP:  Recent Labs      01/05/18 2226

## 2018-01-06 NOTE — PROGRESS NOTES
Recent Labs      01/05/18   2226   BUN  50*       Recent Labs      01/05/18   2226   CREATININE  2.2*       Estimated Creatinine Clearance: 12 mL/min (based on SCr of 2.2 mg/dL).       Plan: Changed Lovenox 40mg Q24hr to 30mg Q24hr    Electronically signed by Chester Mcelroy [facility] on 1/6/2018 at 5:07 AM

## 2018-01-07 ENCOUNTER — APPOINTMENT (OUTPATIENT)
Dept: GENERAL RADIOLOGY | Age: 83
DRG: 871 | End: 2018-01-07
Payer: MEDICARE

## 2018-01-07 LAB
ANION GAP SERPL CALCULATED.3IONS-SCNC: 20 MMOL/L (ref 7–19)
APTT: 41.7 SEC (ref 26–36.2)
BASOPHILS ABSOLUTE: 0 K/UL (ref 0–0.2)
BASOPHILS RELATIVE PERCENT: 0.1 % (ref 0–1)
BUN BLDV-MCNC: 53 MG/DL (ref 8–23)
CALCIUM IONIZED: 1.01 MMOL/L (ref 1.12–1.32)
CALCIUM SERPL-MCNC: 7.9 MG/DL (ref 8.2–9.6)
CHLORIDE BLD-SCNC: 101 MMOL/L (ref 98–111)
CO2: 19 MMOL/L (ref 22–29)
CREAT SERPL-MCNC: 2.4 MG/DL (ref 0.5–0.9)
EKG P AXIS: NORMAL DEGREES
EKG P-R INTERVAL: 50 MS
EKG Q-T INTERVAL: 278 MS
EKG QRS DURATION: 129 MS
EKG QTC CALCULATION (BAZETT): 431 MS
EKG T AXIS: 266 DEGREES
EOSINOPHILS ABSOLUTE: 0 K/UL (ref 0–0.6)
EOSINOPHILS RELATIVE PERCENT: 0 % (ref 0–5)
GFR NON-AFRICAN AMERICAN: 19
GLUCOSE BLD-MCNC: 210 MG/DL (ref 70–99)
GLUCOSE BLD-MCNC: 210 MG/DL (ref 70–99)
GLUCOSE BLD-MCNC: 232 MG/DL (ref 74–109)
GLUCOSE BLD-MCNC: 256 MG/DL (ref 70–99)
HCT VFR BLD CALC: 29.2 % (ref 37–47)
HEMOGLOBIN: 9 G/DL (ref 12–16)
INR BLD: 1.55 (ref 0.88–1.18)
LACTIC ACID: 2.2 MMOL/L (ref 0.5–1.9)
LYMPHOCYTES ABSOLUTE: 1.6 K/UL (ref 1.1–4.5)
LYMPHOCYTES RELATIVE PERCENT: 11.2 % (ref 20–40)
MAGNESIUM: 1.4 MG/DL (ref 1.7–2.3)
MCH RBC QN AUTO: 29.8 PG (ref 27–31)
MCHC RBC AUTO-ENTMCNC: 30.8 G/DL (ref 33–37)
MCV RBC AUTO: 96.7 FL (ref 81–99)
MONOCYTES ABSOLUTE: 0.9 K/UL (ref 0–0.9)
MONOCYTES RELATIVE PERCENT: 6 % (ref 0–10)
NEUTROPHILS ABSOLUTE: 11.6 K/UL (ref 1.5–7.5)
NEUTROPHILS RELATIVE PERCENT: 81.9 % (ref 50–65)
PDW BLD-RTO: 15.4 % (ref 11.5–14.5)
PERFORMED ON: ABNORMAL
PHOSPHORUS: 3.5 MG/DL (ref 2.5–4.5)
PLATELET # BLD: 200 K/UL (ref 130–400)
PMV BLD AUTO: 12.4 FL (ref 9.4–12.3)
POTASSIUM SERPL-SCNC: 5 MMOL/L (ref 3.5–5)
PROTHROMBIN TIME: 18.6 SEC (ref 12–14.6)
RBC # BLD: 3.02 M/UL (ref 4.2–5.4)
SODIUM BLD-SCNC: 140 MMOL/L (ref 136–145)
VANCOMYCIN TROUGH: 8.5 UG/ML (ref 10–20)
WBC # BLD: 14.2 K/UL (ref 4.8–10.8)

## 2018-01-07 PROCEDURE — 2580000003 HC RX 258: Performed by: HOSPITALIST

## 2018-01-07 PROCEDURE — 6360000002 HC RX W HCPCS: Performed by: EMERGENCY MEDICINE

## 2018-01-07 PROCEDURE — 84100 ASSAY OF PHOSPHORUS: CPT

## 2018-01-07 PROCEDURE — 2580000003 HC RX 258: Performed by: EMERGENCY MEDICINE

## 2018-01-07 PROCEDURE — 6360000002 HC RX W HCPCS: Performed by: INTERNAL MEDICINE

## 2018-01-07 PROCEDURE — 80202 ASSAY OF VANCOMYCIN: CPT

## 2018-01-07 PROCEDURE — 82330 ASSAY OF CALCIUM: CPT

## 2018-01-07 PROCEDURE — 36415 COLL VENOUS BLD VENIPUNCTURE: CPT

## 2018-01-07 PROCEDURE — 71045 X-RAY EXAM CHEST 1 VIEW: CPT

## 2018-01-07 PROCEDURE — 99223 1ST HOSP IP/OBS HIGH 75: CPT | Performed by: INTERNAL MEDICINE

## 2018-01-07 PROCEDURE — 76937 US GUIDE VASCULAR ACCESS: CPT | Performed by: SURGERY

## 2018-01-07 PROCEDURE — 83605 ASSAY OF LACTIC ACID: CPT

## 2018-01-07 PROCEDURE — 6360000002 HC RX W HCPCS

## 2018-01-07 PROCEDURE — 36556 INSERT NON-TUNNEL CV CATH: CPT | Performed by: SURGERY

## 2018-01-07 PROCEDURE — 36556 INSERT NON-TUNNEL CV CATH: CPT

## 2018-01-07 PROCEDURE — 85610 PROTHROMBIN TIME: CPT

## 2018-01-07 PROCEDURE — B548ZZA ULTRASONOGRAPHY OF SUPERIOR VENA CAVA, GUIDANCE: ICD-10-PCS | Performed by: INTERNAL MEDICINE

## 2018-01-07 PROCEDURE — 02HV33Z INSERTION OF INFUSION DEVICE INTO SUPERIOR VENA CAVA, PERCUTANEOUS APPROACH: ICD-10-PCS | Performed by: INTERNAL MEDICINE

## 2018-01-07 PROCEDURE — 99233 SBSQ HOSP IP/OBS HIGH 50: CPT | Performed by: HOSPITALIST

## 2018-01-07 PROCEDURE — 85025 COMPLETE CBC W/AUTO DIFF WBC: CPT

## 2018-01-07 PROCEDURE — 6360000002 HC RX W HCPCS: Performed by: HOSPITALIST

## 2018-01-07 PROCEDURE — 83735 ASSAY OF MAGNESIUM: CPT

## 2018-01-07 PROCEDURE — 2000000000 HC ICU R&B

## 2018-01-07 PROCEDURE — 95819 EEG AWAKE AND ASLEEP: CPT

## 2018-01-07 PROCEDURE — 6370000000 HC RX 637 (ALT 250 FOR IP): Performed by: INTERNAL MEDICINE

## 2018-01-07 PROCEDURE — 80048 BASIC METABOLIC PNL TOTAL CA: CPT

## 2018-01-07 PROCEDURE — 2580000003 HC RX 258: Performed by: INTERNAL MEDICINE

## 2018-01-07 PROCEDURE — 95819 EEG AWAKE AND ASLEEP: CPT | Performed by: PSYCHIATRY & NEUROLOGY

## 2018-01-07 PROCEDURE — 2700000000 HC OXYGEN THERAPY PER DAY

## 2018-01-07 PROCEDURE — 82948 REAGENT STRIP/BLOOD GLUCOSE: CPT

## 2018-01-07 PROCEDURE — 85730 THROMBOPLASTIN TIME PARTIAL: CPT

## 2018-01-07 PROCEDURE — 2500000003 HC RX 250 WO HCPCS: Performed by: EMERGENCY MEDICINE

## 2018-01-07 RX ORDER — DIGOXIN 0.25 MG/ML
125 INJECTION INTRAMUSCULAR; INTRAVENOUS ONCE
Status: COMPLETED | OUTPATIENT
Start: 2018-01-07 | End: 2018-01-07

## 2018-01-07 RX ORDER — SODIUM CHLORIDE 0.9 % (FLUSH) 0.9 %
10 SYRINGE (ML) INJECTION PRN
Status: DISCONTINUED | OUTPATIENT
Start: 2018-01-07 | End: 2018-01-13 | Stop reason: HOSPADM

## 2018-01-07 RX ORDER — DIGOXIN 0.25 MG/ML
250 INJECTION INTRAMUSCULAR; INTRAVENOUS ONCE
Status: COMPLETED | OUTPATIENT
Start: 2018-01-07 | End: 2018-01-07

## 2018-01-07 RX ORDER — DIGOXIN 0.25 MG/ML
INJECTION INTRAMUSCULAR; INTRAVENOUS
Status: COMPLETED
Start: 2018-01-07 | End: 2018-01-07

## 2018-01-07 RX ORDER — MAGNESIUM SULFATE IN WATER 40 MG/ML
2 INJECTION, SOLUTION INTRAVENOUS ONCE
Status: COMPLETED | OUTPATIENT
Start: 2018-01-07 | End: 2018-01-07

## 2018-01-07 RX ORDER — SODIUM CHLORIDE 0.9 % (FLUSH) 0.9 %
10 SYRINGE (ML) INJECTION EVERY 12 HOURS SCHEDULED
Status: DISCONTINUED | OUTPATIENT
Start: 2018-01-07 | End: 2018-01-13 | Stop reason: HOSPADM

## 2018-01-07 RX ORDER — 0.9 % SODIUM CHLORIDE 0.9 %
500 INTRAVENOUS SOLUTION INTRAVENOUS ONCE
Status: COMPLETED | OUTPATIENT
Start: 2018-01-07 | End: 2018-01-07

## 2018-01-07 RX ORDER — LIDOCAINE HYDROCHLORIDE 10 MG/ML
5 INJECTION, SOLUTION EPIDURAL; INFILTRATION; INTRACAUDAL; PERINEURAL ONCE
Status: DISCONTINUED | OUTPATIENT
Start: 2018-01-07 | End: 2018-01-13 | Stop reason: HOSPADM

## 2018-01-07 RX ADMIN — MAGNESIUM SULFATE HEPTAHYDRATE 2 G: 40 INJECTION, SOLUTION INTRAVENOUS at 08:25

## 2018-01-07 RX ADMIN — ENOXAPARIN SODIUM 30 MG: 100 INJECTION SUBCUTANEOUS at 11:05

## 2018-01-07 RX ADMIN — DILTIAZEM HYDROCHLORIDE 15 MG/HR: 5 INJECTION INTRAVENOUS at 11:40

## 2018-01-07 RX ADMIN — MEROPENEM 0.5 G: 500 INJECTION, POWDER, FOR SOLUTION INTRAVENOUS at 23:39

## 2018-01-07 RX ADMIN — MEROPENEM 0.5 G: 500 INJECTION, POWDER, FOR SOLUTION INTRAVENOUS at 11:02

## 2018-01-07 RX ADMIN — VANCOMYCIN HYDROCHLORIDE 750 MG: 1 INJECTION, POWDER, LYOPHILIZED, FOR SOLUTION INTRAVENOUS at 05:16

## 2018-01-07 RX ADMIN — DIGOXIN 125 MCG: 250 INJECTION, SOLUTION INTRAMUSCULAR; INTRAVENOUS; PARENTERAL at 08:19

## 2018-01-07 RX ADMIN — Medication 10 ML: at 08:23

## 2018-01-07 RX ADMIN — DIGOXIN 250 MCG: 250 INJECTION, SOLUTION INTRAMUSCULAR; INTRAVENOUS; PARENTERAL at 12:39

## 2018-01-07 RX ADMIN — CALCIUM GLUCONATE 1 G: 94 INJECTION, SOLUTION INTRAVENOUS at 11:38

## 2018-01-07 RX ADMIN — DIGOXIN 125 MCG: 0.25 INJECTION INTRAMUSCULAR; INTRAVENOUS at 08:19

## 2018-01-07 RX ADMIN — DILTIAZEM HYDROCHLORIDE 15 MG/HR: 5 INJECTION INTRAVENOUS at 21:59

## 2018-01-07 RX ADMIN — Medication 10 ML: at 21:41

## 2018-01-07 RX ADMIN — DILTIAZEM HYDROCHLORIDE 15 MG/HR: 5 INJECTION INTRAVENOUS at 02:59

## 2018-01-07 RX ADMIN — INSULIN GLARGINE 5 UNITS: 100 INJECTION, SOLUTION SUBCUTANEOUS at 21:40

## 2018-01-07 RX ADMIN — SODIUM CHLORIDE 500 ML: 9 INJECTION, SOLUTION INTRAVENOUS at 00:45

## 2018-01-07 RX ADMIN — Medication 10 ML: at 22:00

## 2018-01-07 ASSESSMENT — PAIN SCALES - PAIN ASSESSMENT IN ADVANCED DEMENTIA (PAINAD)
FACIALEXPRESSION: 0
CONSOLABILITY: 0
FACIALEXPRESSION: 0
TOTALSCORE: 2
BREATHING: 0
NEGVOCALIZATION: 1
BREATHING: 0
BODYLANGUAGE: 1
BREATHING: 0
BODYLANGUAGE: 1
NEGVOCALIZATION: 1
TOTALSCORE: 2
TOTALSCORE: 2
NEGVOCALIZATION: 1
CONSOLABILITY: 0
CONSOLABILITY: 0
BODYLANGUAGE: 1
FACIALEXPRESSION: 0

## 2018-01-07 NOTE — PROGRESS NOTES
Reyes Cline MD   10 mL at 01/08/18 0726    sodium chloride flush 0.9 % injection 10 mL  10 mL Intravenous PRN Charlene Horn MD        atorvastatin (LIPITOR) tablet 40 mg  40 mg Oral Daily Maria Dolores Kay MD        pantoprazole sodium (PROTONIX) packet 40 mg  40 mg Oral QAM AC Maria Dolores Kay MD        levothyroxine (SYNTHROID) tablet 25 mcg  25 mcg Oral BID Maria Dolores Kay MD        aspirin chewable tablet 81 mg  81 mg Oral Daily Maria Dolores Kay MD        insulin glargine (LANTUS) injection vial 5 Units  5 Units Subcutaneous Nightly Maria Dolores Kay MD   5 Units at 01/07/18 2140    carvedilol (COREG) tablet 6.25 mg  6.25 mg Oral BID Maria Dolores Kay MD   Stopped at 01/07/18 2100    sodium chloride flush 0.9 % injection 10 mL  10 mL Intravenous 2 times per day Maria Dolores Kay MD   10 mL at 01/08/18 0725    sodium chloride flush 0.9 % injection 10 mL  10 mL Intravenous PRN Maria Dolores Kay MD        acetaminophen (TYLENOL) tablet 650 mg  650 mg Oral Q4H PRN Maria Dolores Kay MD        glucose (GLUTOSE) 40 % oral gel 15 g  15 g Oral PRN Maria Dolores Kay MD        dextrose 50 % solution 12.5 g  12.5 g Intravenous PRN Maria Dolores Kay MD        glucagon (rDNA) injection 1 mg  1 mg Intramuscular PRN Maria Dolores Kay MD        dextrose 5 % solution  100 mL/hr Intravenous PRN Maria Dolores Kay MD        0.9 % sodium chloride infusion   Intravenous Continuous Maria Dolores Kay MD 75 mL/hr at 01/07/18 1402      enoxaparin (LOVENOX) injection 30 mg  30 mg Subcutaneous Daily Waldemar Bartlett MD   30 mg at 01/08/18 0801    meropenem (MERREM) 0.5 g in dextrose 5 % 100 mL IVPB  0.5 g Intravenous Q12H Charlene Horn MD   Stopped at 01/08/18 1252    diltiazem 125 mg in dextrose 5 % 125 mL infusion  5 mg/hr Intravenous Continuous Waldemar Bartlett MD 15 mL/hr at 01/08/18 0725 15 mg/hr at 01/08/18 0725    levofloxacin (LEVAQUIN) 750 MG/150ML infusion 750 mg  750 mg 5.0  4.0   ANIONGAP  22*   --   20*  14   CL  96*   --   101  100   CO2  18*   --   19*  20*   BUN  52*   --   53*  52*   CREATININE  2.3*   --   2.4*  2.1*   GLUCOSE  179*   --   232*  173*   CALCIUM  8.9   --   7.9*  7.5*     Recent Labs      01/06/18   0431  01/07/18   0205  01/08/18   0507   MG  0.6*  1.4*  1.9   PHOS  2.1*  3.5  2.7     Recent Labs      01/05/18   2226  01/06/18   0431   AST  39*  47*   ALT  19  28   BILITOT  0.5  0.7   ALKPHOS  71  67     HgBA1c:  No components found for: HGBA1C  Troponin T:   Recent Labs      01/05/18   2230   TROPONINI  0.00     Pro-BNP: No results for input(s): BNP in the last 72 hours. INR:   Recent Labs      01/05/18 2226 01/07/18   0205   INR  1.18  1.55*     ABGs:   Lab Results   Component Value Date    PHART 7.440 01/06/2018    PO2ART 84.0 01/06/2018    UIN1XFW 24.0 01/06/2018     UA:  Recent Labs      01/05/18   2325   COLORU  YELLOW   PHUR  5.5   CLARITYU  CLOUDY*   SPECGRAV  1.012   LEUKOCYTESUR  Negative   UROBILINOGEN  0.2   BILIRUBINUR  Negative   BLOODU  Negative   GLUCOSEU  Negative       RAD:   Narrative   XR CHEST PORTABLE 1/7/2018 6:00 PM   HISTORY: Central line placement   COMPARISON: 1/5/2018. FINDINGS:   Frontal view of the chest was obtained.       A right subclavian approach central venous catheter extends into the   SVC. There is no evidence of pneumothorax. Airspace opacities in the   right lung could be due to atelectasis, pneumonia, or asymmetric   pulmonary edema. The heart remains enlarged but stable in size.    The osseous structures and surrounding soft tissues demonstrate no   acute abnormality.         EKG/Telemetry: atrial fibrillation    Echo:   9/11/2017  Summary   Normal left ventricular size with severe impairment of LV function and an   estimated ejection fraction of approximately 10-15%.   Severe global hypokinesis noted.   Mild concentric left ventricular hypertrophy noted.   No evidence of left ventricular mass or thrombus

## 2018-01-07 NOTE — CONSULTS
[facility]       Cardiology Consultation      Date of Admission:  1/5/2018  9:56 PM    Date of Initially Being Seen / Consultation:  1/7/18    Cardiologist:  KAVON Hardin MD    Attending: hospitalist      PCP:  No primary care provider on file. Reason for Consultation or Admission / Chief Complaint:  Altered mental status    SUBJECTIVE AND HISTORY OF PRESENT ILLNESS:    Source of the history:  Patient, family, previous inpatient and outpatient records in [facility], and from ER note    Chilango England is a 80 y.o. female who presents to [facility] ICU with symptoms / signs / problem or diagnosis of altered mental status. We are asked to see the patient because she has rapid atrial fibrillation. No further history is obtainable from the patient at this time as she is unresponsive. CARDIAC RISK PROFILE:    Risk Factor Yes / No / Unknown       Cigarette Use See below   Diabetes Mellitus See below   Family History of CAD See below   Hypercholesteremia See below   Hypertension See below          Cardiac Specific Problems:    Specialty Problems        Cardiology Problems    Acute on chronic combined systolic and diastolic heart failure (HCC)        ST elevation myocardial infarction involving left anterior descending (LAD) coronary artery (HCC)        Chronic atrial fibrillation ([facility] [address]                PRIOR CARDIAC PROBLEM LIST  (IF APPLICABLE):    As listed. Her atrial fibrillation is said to be chronic.       Past Medical History:  Past Medical History:   Diagnosis Date    A-fib [facility])     Alzheimer disease     Atrial fibrillation (HCC)     CAD (coronary artery disease)     Cerebral artery occlusion with cerebral infarction ([facility] [address]     CHF (congestive heart failure) (HCC)     Diabetes mellitus ([facility] [address]     Encephalopathy     GI bleed     Myocardial infarction     Palliative care encounter     Sepsis [facility])        Past Surgical History:  Past Surgical History:   Procedure rhythm, 120 bpm    Troponin:  Not obtained as far as I can tell for myocardial necrosis    CBC:   Recent Labs      01/05/18 2226 01/06/18   0431 01/07/18   0205   WBC  15.5*  15.4*  14.2*   HGB  10.3*  9.7*  9.0*   HCT  33.3*  31.5*  29.2*   MCV  96.2  95.2  96.7   PLT  270  221  200     BMP:   Recent Labs      01/05/18 2226 01/06/18 0431  01/06/18   0501  01/07/18   0205   NA  133*   --   136   --   140   K  6.3*   < >  5.5*  5.1  5.0   CL  92*   --   96*   --   101   CO2  19*   --   18*   --   19*   PHOS   --    --   2.1*   --   3.5   BUN  50*   --   52*   --   53*   CREATININE  2.2*   --   2.3*   --   2.4*    < > = values in this interval not displayed. Cardiac Enzymes:   Recent Labs      01/05/18 2230   TROPONINI  0.00     PT/INR:   Recent Labs      01/05/18 2226 01/07/18   0205   PROTIME  15.0*  18.6*   INR  1.18  1.55*     APTT:   Recent Labs      01/05/18 2226 01/07/18   0205   APTT  32.2  41.7*     Liver Profile:  Lab Results   Component Value Date    AST 47 01/06/2018    ALT 28 01/06/2018    BILIDIR 0.3 01/06/2018    BILITOT 0.7 01/06/2018    ALKPHOS 67 01/06/2018   No results found for: CHOL, HDL, TRIG  TSH:  Lab Results   Component Value Date    TSH 3.530 09/07/2017     UA:   Lab Results   Component Value Date    COLORU YELLOW 01/05/2018    PHUR 5.5 01/05/2018    WBCUA 1 09/07/2017    RBCUA 4 09/07/2017    BACTERIA NEGATIVE 09/07/2017    CLARITYU CLOUDY 01/05/2018    SPECGRAV 1.012 01/05/2018    LEUKOCYTESUR Negative 01/05/2018    UROBILINOGEN 0.2 01/05/2018    BILIRUBINUR Negative 01/05/2018    BLOODU Negative 01/05/2018    GLUCOSEU Negative 01/05/2018             ALL THE CARDIOLOGY PROBLEMS ARE LISTED ABOVE; HOWEVER, THE FOLLOWING SPECIFIC CARDIAC PROBLEMS WERE ADDRESSED AND TREATED DURING THE HOSPITAL VISIT TODAY:       Cardiac Specific Problem / Diagnosis  Discussion / Medical Decision Making Plan          1.  Atrial fibrillation with rapid ventricular response  are worsening

## 2018-01-07 NOTE — PROGRESS NOTES
Pharmacy Vancomycin Consult     Vancomycin Day: 2  Current Dosing: Pulse dosing    Temp max:  99.3    Recent Labs      01/06/18   0431  01/07/18   0205   BUN  52*  53*       Recent Labs      01/06/18   0431  01/07/18   0205   CREATININE  2.3*  2.4*       Recent Labs      01/06/18   0431  01/07/18   0205   WBC  15.4*  14.2*         Intake/Output Summary (Last 24 hours) at 01/07/18 0258  Last data filed at 01/07/18 0000   Gross per 24 hour   Intake 1634.12 ml   Output 400 ml   Net 1234.12 ml     No current cultures at this time  Culture Date Source Results                      Ht Readings from Last 1 Encounters:   01/06/18 5' 2\" (1.575 m)        Wt Readings from Last 1 Encounters:   01/06/18 105 lb 11.2 oz (47.9 kg)         Body mass index is 19.33 kg/m². Estimated Creatinine Clearance: 11 mL/min (based on SCr of 2.4 mg/dL).     Trough: 8.5    Assessment/Plan: Give Vancomycin 750mg x1 dose today with random level in AM    Electronically signed by Milena Cohen, [address] [place] [address] on 1/7/2018 at 2:58 AM

## 2018-01-07 NOTE — PROGRESS NOTES
Department of General Surgery - Adult  Surgical Service : General  Attending Progress Note      SUBJECTIVE:  No major event over night. Discussed with nurse. OBJECTIVE      Physical    VITALS:  BP 99/64   Pulse 130   Temp 98.2 °F (36.8 °C) (Temporal)   Resp 20   Ht 5' 2\" (1.575 m)   Wt 107 lb 14.4 oz (48.9 kg)   SpO2 95%   BMI 19.74 kg/m²   CONSTITUTIONAL:  Sleeping but would open eyes to voice, still not following commands  LUNGS:  No increased work of breathing, good air exchange, clear to auscultation bilaterally, no crackles or wheezing  CARDIOVASCULAR:  Irregular rate and rhythm  ABDOMEN:  Remains soft, I palpated her abdomen extensively. For the most part, she had no tenderness and she had no reactions to my palpation. A few times, she appeared to be in pain when I palpated her abdomen, but it was very inconsistent. There was no guarding or rebound. She had positive bowel sounds.   Data  CBC:   Lab Results   Component Value Date    WBC 14.2 01/07/2018    RBC 3.02 01/07/2018    HGB 9.0 01/07/2018    HCT 29.2 01/07/2018    MCV 96.7 01/07/2018    MCH 29.8 01/07/2018    MCHC 30.8 01/07/2018    RDW 15.4 01/07/2018     01/07/2018    MPV 12.4 01/07/2018     BMP:    Lab Results   Component Value Date     01/07/2018    K 5.0 01/07/2018     01/07/2018    CO2 19 01/07/2018    BUN 53 01/07/2018    LABALBU 3.4 01/06/2018    CREATININE 2.4 01/07/2018    CALCIUM 7.9 01/07/2018    LABGLOM 19 01/07/2018    GLUCOSE 232 01/07/2018     Lactic acid 2.2    Current Inpatient Medications    Current Facility-Administered Medications: magnesium sulfate 2 g in 50 mL IVPB premix, 2 g, Intravenous, Once  calcium gluconate 1 g in dextrose 5 % 250 mL IVPB, 1 g, Intravenous, Once  atorvastatin (LIPITOR) tablet 40 mg, 40 mg, Oral, Daily  pantoprazole sodium (PROTONIX) packet 40 mg, 40 mg, Oral, QAM AC  levothyroxine (SYNTHROID) tablet 25 mcg, 25 mcg, Oral, BID  aspirin chewable tablet 81 mg, 81 mg, Oral,

## 2018-01-08 PROBLEM — E83.42 HYPOMAGNESEMIA: Status: ACTIVE | Noted: 2018-01-08

## 2018-01-08 PROBLEM — F02.80 ALZHEIMER DISEASE (HCC): Status: ACTIVE | Noted: 2018-01-08

## 2018-01-08 PROBLEM — I25.10 CAD (CORONARY ARTERY DISEASE): Status: ACTIVE | Noted: 2018-01-08

## 2018-01-08 PROBLEM — G30.9 ALZHEIMER DISEASE (HCC): Status: ACTIVE | Noted: 2018-01-08

## 2018-01-08 PROBLEM — J18.9 PNEUMONIA DUE TO INFECTIOUS ORGANISM: Status: ACTIVE | Noted: 2018-01-08

## 2018-01-08 PROBLEM — I48.91 ATRIAL FIBRILLATION WITH RVR (HCC): Status: ACTIVE | Noted: 2018-01-08

## 2018-01-08 LAB
ANION GAP SERPL CALCULATED.3IONS-SCNC: 14 MMOL/L (ref 7–19)
BASOPHILS ABSOLUTE: 0 K/UL (ref 0–0.2)
BASOPHILS RELATIVE PERCENT: 0.1 % (ref 0–1)
BUN BLDV-MCNC: 52 MG/DL (ref 8–23)
CALCIUM IONIZED: 1.05 MMOL/L (ref 1.12–1.32)
CALCIUM SERPL-MCNC: 7.5 MG/DL (ref 8.2–9.6)
CHLORIDE BLD-SCNC: 100 MMOL/L (ref 98–111)
CO2: 20 MMOL/L (ref 22–29)
CREAT SERPL-MCNC: 2.1 MG/DL (ref 0.5–0.9)
DIGOXIN LEVEL: 2.1 NG/ML (ref 0.6–1.2)
EKG P AXIS: NORMAL DEGREES
EKG P-R INTERVAL: NORMAL MS
EKG Q-T INTERVAL: 326 MS
EKG QRS DURATION: 116 MS
EKG QTC CALCULATION (BAZETT): 457 MS
EKG T AXIS: -66 DEGREES
EOSINOPHILS ABSOLUTE: 0 K/UL (ref 0–0.6)
EOSINOPHILS RELATIVE PERCENT: 0.2 % (ref 0–5)
GFR NON-AFRICAN AMERICAN: 22
GLUCOSE BLD-MCNC: 135 MG/DL (ref 70–99)
GLUCOSE BLD-MCNC: 173 MG/DL (ref 70–99)
GLUCOSE BLD-MCNC: 173 MG/DL (ref 74–109)
GLUCOSE BLD-MCNC: 239 MG/DL (ref 70–99)
HCT VFR BLD CALC: 26.9 % (ref 37–47)
HEMOGLOBIN: 8.5 G/DL (ref 12–16)
LYMPHOCYTES ABSOLUTE: 1.4 K/UL (ref 1.1–4.5)
LYMPHOCYTES RELATIVE PERCENT: 9.6 % (ref 20–40)
MAGNESIUM: 1.9 MG/DL (ref 1.7–2.3)
MCH RBC QN AUTO: 29.5 PG (ref 27–31)
MCHC RBC AUTO-ENTMCNC: 31.6 G/DL (ref 33–37)
MCV RBC AUTO: 93.4 FL (ref 81–99)
MONOCYTES ABSOLUTE: 0.9 K/UL (ref 0–0.9)
MONOCYTES RELATIVE PERCENT: 5.9 % (ref 0–10)
NEUTROPHILS ABSOLUTE: 12 K/UL (ref 1.5–7.5)
NEUTROPHILS RELATIVE PERCENT: 83.6 % (ref 50–65)
PDW BLD-RTO: 15.3 % (ref 11.5–14.5)
PERFORMED ON: ABNORMAL
PHOSPHORUS: 2.7 MG/DL (ref 2.5–4.5)
PLATELET # BLD: 199 K/UL (ref 130–400)
PMV BLD AUTO: 12.2 FL (ref 9.4–12.3)
POTASSIUM SERPL-SCNC: 4 MMOL/L (ref 3.5–5)
RBC # BLD: 2.88 M/UL (ref 4.2–5.4)
SODIUM BLD-SCNC: 134 MMOL/L (ref 136–145)
VANCOMYCIN TROUGH: 14.6 UG/ML (ref 10–20)
WBC # BLD: 14.3 K/UL (ref 4.8–10.8)

## 2018-01-08 PROCEDURE — 2500000003 HC RX 250 WO HCPCS: Performed by: EMERGENCY MEDICINE

## 2018-01-08 PROCEDURE — 84100 ASSAY OF PHOSPHORUS: CPT

## 2018-01-08 PROCEDURE — G8996 SWALLOW CURRENT STATUS: HCPCS

## 2018-01-08 PROCEDURE — 6370000000 HC RX 637 (ALT 250 FOR IP): Performed by: INTERNAL MEDICINE

## 2018-01-08 PROCEDURE — 6360000002 HC RX W HCPCS: Performed by: EMERGENCY MEDICINE

## 2018-01-08 PROCEDURE — 83735 ASSAY OF MAGNESIUM: CPT

## 2018-01-08 PROCEDURE — 82330 ASSAY OF CALCIUM: CPT

## 2018-01-08 PROCEDURE — 6360000002 HC RX W HCPCS: Performed by: INTERNAL MEDICINE

## 2018-01-08 PROCEDURE — 2580000003 HC RX 258: Performed by: EMERGENCY MEDICINE

## 2018-01-08 PROCEDURE — 2580000003 HC RX 258: Performed by: HOSPITALIST

## 2018-01-08 PROCEDURE — 85025 COMPLETE CBC W/AUTO DIFF WBC: CPT

## 2018-01-08 PROCEDURE — 82948 REAGENT STRIP/BLOOD GLUCOSE: CPT

## 2018-01-08 PROCEDURE — 80202 ASSAY OF VANCOMYCIN: CPT

## 2018-01-08 PROCEDURE — 6370000000 HC RX 637 (ALT 250 FOR IP): Performed by: HOSPITALIST

## 2018-01-08 PROCEDURE — 99233 SBSQ HOSP IP/OBS HIGH 50: CPT | Performed by: INTERNAL MEDICINE

## 2018-01-08 PROCEDURE — 2140000000 HC CCU INTERMEDIATE R&B

## 2018-01-08 PROCEDURE — 36592 COLLECT BLOOD FROM PICC: CPT

## 2018-01-08 PROCEDURE — 6360000002 HC RX W HCPCS: Performed by: HOSPITALIST

## 2018-01-08 PROCEDURE — G8997 SWALLOW GOAL STATUS: HCPCS

## 2018-01-08 PROCEDURE — 2700000000 HC OXYGEN THERAPY PER DAY

## 2018-01-08 PROCEDURE — 92610 EVALUATE SWALLOWING FUNCTION: CPT

## 2018-01-08 PROCEDURE — 2500000003 HC RX 250 WO HCPCS: Performed by: INTERNAL MEDICINE

## 2018-01-08 PROCEDURE — 80162 ASSAY OF DIGOXIN TOTAL: CPT

## 2018-01-08 PROCEDURE — 2580000003 HC RX 258: Performed by: INTERNAL MEDICINE

## 2018-01-08 PROCEDURE — 80048 BASIC METABOLIC PNL TOTAL CA: CPT

## 2018-01-08 RX ORDER — METOPROLOL TARTRATE 5 MG/5ML
5 INJECTION INTRAVENOUS EVERY 6 HOURS
Status: DISCONTINUED | OUTPATIENT
Start: 2018-01-08 | End: 2018-01-10 | Stop reason: ALTCHOICE

## 2018-01-08 RX ADMIN — METOPROLOL TARTRATE 5 MG: 5 INJECTION, SOLUTION INTRAVENOUS at 10:59

## 2018-01-08 RX ADMIN — MEROPENEM 0.5 G: 500 INJECTION, POWDER, FOR SOLUTION INTRAVENOUS at 10:59

## 2018-01-08 RX ADMIN — INSULIN GLARGINE 5 UNITS: 100 INJECTION, SOLUTION SUBCUTANEOUS at 21:45

## 2018-01-08 RX ADMIN — DILTIAZEM HYDROCHLORIDE 15 MG/HR: 5 INJECTION INTRAVENOUS at 16:37

## 2018-01-08 RX ADMIN — VANCOMYCIN HYDROCHLORIDE 750 MG: 1 INJECTION, POWDER, LYOPHILIZED, FOR SOLUTION INTRAVENOUS at 08:01

## 2018-01-08 RX ADMIN — Medication 10 ML: at 07:26

## 2018-01-08 RX ADMIN — INSULIN LISPRO 1 UNITS: 100 INJECTION, SOLUTION INTRAVENOUS; SUBCUTANEOUS at 15:07

## 2018-01-08 RX ADMIN — SODIUM CHLORIDE: 9 INJECTION, SOLUTION INTRAVENOUS at 14:07

## 2018-01-08 RX ADMIN — METOPROLOL TARTRATE 5 MG: 5 INJECTION, SOLUTION INTRAVENOUS at 21:49

## 2018-01-08 RX ADMIN — Medication 10 ML: at 07:25

## 2018-01-08 RX ADMIN — MEROPENEM 0.5 G: 500 INJECTION, POWDER, FOR SOLUTION INTRAVENOUS at 22:38

## 2018-01-08 RX ADMIN — INSULIN LISPRO 1 UNITS: 100 INJECTION, SOLUTION INTRAVENOUS; SUBCUTANEOUS at 21:48

## 2018-01-08 RX ADMIN — LEVOTHYROXINE SODIUM 25 MCG: 25 TABLET ORAL at 21:49

## 2018-01-08 RX ADMIN — DILTIAZEM HYDROCHLORIDE 15 MG/HR: 5 INJECTION INTRAVENOUS at 07:25

## 2018-01-08 RX ADMIN — METOPROLOL TARTRATE 5 MG: 5 INJECTION, SOLUTION INTRAVENOUS at 15:06

## 2018-01-08 RX ADMIN — ENOXAPARIN SODIUM 30 MG: 100 INJECTION SUBCUTANEOUS at 08:01

## 2018-01-08 RX ADMIN — CARVEDILOL 6.25 MG: 6.25 TABLET, FILM COATED ORAL at 21:45

## 2018-01-08 ASSESSMENT — PAIN SCALES - GENERAL: PAINLEVEL_OUTOF10: 0

## 2018-01-08 NOTE — PROGRESS NOTES
650 mg, Oral, Q4H PRN  glucose (GLUTOSE) 40 % oral gel 15 g, 15 g, Oral, PRN  dextrose 50 % solution 12.5 g, 12.5 g, Intravenous, PRN  glucagon (rDNA) injection 1 mg, 1 mg, Intramuscular, PRN  dextrose 5 % solution, 100 mL/hr, Intravenous, PRN  0.9 % sodium chloride infusion, , Intravenous, Continuous  enoxaparin (LOVENOX) injection 30 mg, 30 mg, Subcutaneous, Daily  meropenem (MERREM) 0.5 g in dextrose 5 % 100 mL IVPB, 0.5 g, Intravenous, Q12H  diltiazem 125 mg in dextrose 5 % 125 mL infusion, 5 mg/hr, Intravenous, Continuous  levofloxacin (LEVAQUIN) 750 MG/150ML infusion 750 mg, 750 mg, Intravenous, Once  vancomycin (VANCOCIN) intermittent dosing (placeholder), , Other, RX Placeholder    ASSESSMENT AND PLAN    1) Altered mental status, lactic acidosis, abdominal pain - Mental status appears to be better today. Abdomen remains benign. Will sign off for now. Please call if needed. Thank you.

## 2018-01-08 NOTE — PROCEDURES
ADULT INPATIENT ELECTROENCEPHALOGRAM REPORT    Patient:   Werner Aguilar  MR#:    [id number]  Room #:    INPATIENT  YOB: 1925  Date of Evaluation:  1/7/2018  Referring Physician:   CHELITA Beltran DO    CLINICAL INFORMATION:     This patient is a 80 y.o. female with a history of AMS. MEDICATIONS:     See MAR. RECORDING CONDITIONS:     This EEG was performed utilizing standard International 10-20 System of electrode placement, with additional channels monitored for eye movement. One channel electrocardiogram was monitored. Data was obtained, stored, and interpreted according to ACNS guidelines (J Clin Neurophysiol 2006;23(2):) utilizing referential montage recording, with reformatting to longitudinal, transverse bipolar, and referential montages as necessary for interpretation, along with the digital/automated EEG analysis. Patient tolerated entire procedure well. Photic stimulation and hyperventilation were utilized as activation procedures unless otherwise specified below. E.E.G. DESCRIPTION:     Diffuse slowing the background rhythm was noted. Intermixed sleep architecture was noted. Hyperventilation was not performed. Photic stimulation was performed and had little change on the recording. Muscle, motion, and eye movement artifacts were noted. EEG INTERPRETATION:    Abnormal EEG due to diffuse slowing of the background rhythm at times appearing slightly more prominent over the right hemisphere. No overt epileptiform abnormalities were noted. CLINICAL CORRELATION:     This EEG is suggestive of a moderate encephalopathy, nonspecific to etiology.        Brendon Pitts DO  Board Certified Neurologist    Date reported: 1/8/2018  Date signed: 1/8/2018

## 2018-01-08 NOTE — PROGRESS NOTES
Palliative Care:   Pt known to palliative care. Followed by [facility] AND [facility] on last admission. Pt is pleasantly confused. Cannot state her name or where she is. No family present at this time. Past Medical History:   A-Fib, Alzheimer's, A fib, CAD, CHF, DM, Encephalopathy, GI bleed, MI, Sepsis      Advance Directives:    DNR-CC      Pain/Other Symptoms:    Pt does not appear to be in pain or distress. Activity:    As clay         Psychological/Spiritual:  Unable to assess at this time. Patient/Family Discussion:   Report from pt nurse, Lucy Palumbo RN. Pt lives at home with her son. Pt was brought to ED with AMS. According to family pt had an episode of \"staring\" and was non verbal, stating this is not pt \"normal\". Pt is currently on Cardizem gtt for afib 110-130's  Unsure of pt activity level. Possible pt will move to the floor today. Plan/expectations:  Move to floor today     Long term goals:    Home on DC. PC will follow for support and POC.     Electronically signed by Bean Ordaz RN on 1/8/2018 at 11:33 AM

## 2018-01-08 NOTE — PROGRESS NOTES
times.)  Decreased Laryngeal Elevation: All (Laryngeal elevation was considered to be sluggish and moderate-severely decreased for swallow airway protection.)  Pharyngeal: No outward S/S penetration/aspiration was noted with any puree consistency trial, regular solid consistency trial, honey thick H2O trial, or nectar thick H2O trial. Despite exhibiting fast oral transit and suspected swallow delay, no outward S/S penetration/aspiration was observed with any thin H2O trial.     At this time, would recommend dental soft consistency and nectar thick liquids. Meds crushed in puree. TOTAL FEED. G-Code  SLP G-Codes  Functional Limitations: Swallowing  Swallow Current Status (): At least 40 percent but less than 60 percent impaired, limited or restricted  Swallow Goal Status ():  At least 40 percent but less than 60 percent impaired, limited or restricted    Electronically signed by SHILO Garcia on 1/8/2018 at 2:46 PM

## 2018-01-09 PROBLEM — E87.1 HYPONATREMIA: Status: ACTIVE | Noted: 2018-01-09

## 2018-01-09 LAB
ANION GAP SERPL CALCULATED.3IONS-SCNC: 13 MMOL/L (ref 7–19)
BUN BLDV-MCNC: 45 MG/DL (ref 8–23)
CALCIUM SERPL-MCNC: 7.3 MG/DL (ref 8.2–9.6)
CHLORIDE BLD-SCNC: 99 MMOL/L (ref 98–111)
CO2: 20 MMOL/L (ref 22–29)
CREAT SERPL-MCNC: 2 MG/DL (ref 0.5–0.9)
FERRITIN: 375.2 NG/ML (ref 13–150)
FOLATE: 3.1 NG/ML (ref 4.8–37.3)
GFR NON-AFRICAN AMERICAN: 23
GLUCOSE BLD-MCNC: 137 MG/DL (ref 70–99)
GLUCOSE BLD-MCNC: 178 MG/DL (ref 70–99)
GLUCOSE BLD-MCNC: 188 MG/DL (ref 70–99)
GLUCOSE BLD-MCNC: 201 MG/DL (ref 74–109)
GLUCOSE BLD-MCNC: 311 MG/DL (ref 70–99)
HCT VFR BLD CALC: 26.3 % (ref 37–47)
HCT VFR BLD CALC: 27.4 % (ref 37–47)
HEMOGLOBIN: 8.3 G/DL (ref 12–16)
IRON SATURATION: 21 % (ref 14–50)
IRON: 33 UG/DL (ref 37–145)
MCH RBC QN AUTO: 29.6 PG (ref 27–31)
MCHC RBC AUTO-ENTMCNC: 31.6 G/DL (ref 33–37)
MCV RBC AUTO: 93.9 FL (ref 81–99)
PDW BLD-RTO: 15.2 % (ref 11.5–14.5)
PERFORMED ON: ABNORMAL
PLATELET # BLD: 185 K/UL (ref 130–400)
PMV BLD AUTO: 12.1 FL (ref 9.4–12.3)
POTASSIUM SERPL-SCNC: 3.7 MMOL/L (ref 3.5–5)
RBC # BLD: 2.8 M/UL (ref 4.2–5.4)
RETICULOCYTE ABSOLUTE COUNT: 0.07 M/UL (ref 0.03–0.12)
RETICULOCYTE COUNT PCT: 2.55 % (ref 0.5–1.5)
SODIUM BLD-SCNC: 132 MMOL/L (ref 136–145)
TOTAL IRON BINDING CAPACITY: 159 UG/DL (ref 250–400)
VANCOMYCIN TROUGH: 20.3 UG/ML (ref 10–20)
VITAMIN B-12: 1584 PG/ML (ref 211–946)
WBC # BLD: 11.5 K/UL (ref 4.8–10.8)

## 2018-01-09 PROCEDURE — 2500000003 HC RX 250 WO HCPCS: Performed by: EMERGENCY MEDICINE

## 2018-01-09 PROCEDURE — 2580000003 HC RX 258: Performed by: EMERGENCY MEDICINE

## 2018-01-09 PROCEDURE — 82607 VITAMIN B-12: CPT

## 2018-01-09 PROCEDURE — 99233 SBSQ HOSP IP/OBS HIGH 50: CPT | Performed by: HOSPITALIST

## 2018-01-09 PROCEDURE — 6370000000 HC RX 637 (ALT 250 FOR IP): Performed by: HOSPITALIST

## 2018-01-09 PROCEDURE — 99232 SBSQ HOSP IP/OBS MODERATE 35: CPT | Performed by: HOSPITALIST

## 2018-01-09 PROCEDURE — 85045 AUTOMATED RETICULOCYTE COUNT: CPT

## 2018-01-09 PROCEDURE — 2700000000 HC OXYGEN THERAPY PER DAY

## 2018-01-09 PROCEDURE — 82746 ASSAY OF FOLIC ACID SERUM: CPT

## 2018-01-09 PROCEDURE — 99233 SBSQ HOSP IP/OBS HIGH 50: CPT | Performed by: INTERNAL MEDICINE

## 2018-01-09 PROCEDURE — 2500000003 HC RX 250 WO HCPCS: Performed by: INTERNAL MEDICINE

## 2018-01-09 PROCEDURE — 80202 ASSAY OF VANCOMYCIN: CPT

## 2018-01-09 PROCEDURE — 80048 BASIC METABOLIC PNL TOTAL CA: CPT

## 2018-01-09 PROCEDURE — 83550 IRON BINDING TEST: CPT

## 2018-01-09 PROCEDURE — 82728 ASSAY OF FERRITIN: CPT

## 2018-01-09 PROCEDURE — 85027 COMPLETE CBC AUTOMATED: CPT

## 2018-01-09 PROCEDURE — 82948 REAGENT STRIP/BLOOD GLUCOSE: CPT

## 2018-01-09 PROCEDURE — 2580000003 HC RX 258: Performed by: HOSPITALIST

## 2018-01-09 PROCEDURE — 6360000002 HC RX W HCPCS: Performed by: HOSPITALIST

## 2018-01-09 PROCEDURE — 83540 ASSAY OF IRON: CPT

## 2018-01-09 PROCEDURE — 92526 ORAL FUNCTION THERAPY: CPT

## 2018-01-09 PROCEDURE — 2140000000 HC CCU INTERMEDIATE R&B

## 2018-01-09 PROCEDURE — 6370000000 HC RX 637 (ALT 250 FOR IP): Performed by: INTERNAL MEDICINE

## 2018-01-09 PROCEDURE — 2580000003 HC RX 258: Performed by: INTERNAL MEDICINE

## 2018-01-09 RX ORDER — FUROSEMIDE 10 MG/ML
20 INJECTION INTRAMUSCULAR; INTRAVENOUS ONCE
Status: COMPLETED | OUTPATIENT
Start: 2018-01-09 | End: 2018-01-09

## 2018-01-09 RX ADMIN — MEROPENEM 500 MG: 1 INJECTION, POWDER, FOR SOLUTION INTRAVENOUS at 10:52

## 2018-01-09 RX ADMIN — INSULIN LISPRO 1 UNITS: 100 INJECTION, SOLUTION INTRAVENOUS; SUBCUTANEOUS at 12:41

## 2018-01-09 RX ADMIN — PANTOPRAZOLE SODIUM 40 MG: 40 GRANULE, DELAYED RELEASE ORAL at 06:02

## 2018-01-09 RX ADMIN — CARVEDILOL 6.25 MG: 6.25 TABLET, FILM COATED ORAL at 09:27

## 2018-01-09 RX ADMIN — Medication 10 ML: at 09:28

## 2018-01-09 RX ADMIN — METOPROLOL TARTRATE 5 MG: 5 INJECTION, SOLUTION INTRAVENOUS at 20:29

## 2018-01-09 RX ADMIN — Medication 10 ML: at 09:30

## 2018-01-09 RX ADMIN — INSULIN GLARGINE 5 UNITS: 100 INJECTION, SOLUTION SUBCUTANEOUS at 22:23

## 2018-01-09 RX ADMIN — ATORVASTATIN CALCIUM 40 MG: 40 TABLET, FILM COATED ORAL at 09:27

## 2018-01-09 RX ADMIN — Medication 10 ML: at 20:30

## 2018-01-09 RX ADMIN — SODIUM CHLORIDE: 9 INJECTION, SOLUTION INTRAVENOUS at 02:42

## 2018-01-09 RX ADMIN — FUROSEMIDE 20 MG: 10 INJECTION INTRAMUSCULAR; INTRAVENOUS at 17:07

## 2018-01-09 RX ADMIN — LEVOTHYROXINE SODIUM 25 MCG: 25 TABLET ORAL at 20:32

## 2018-01-09 RX ADMIN — DILTIAZEM HYDROCHLORIDE 5 MG/HR: 5 INJECTION INTRAVENOUS at 22:26

## 2018-01-09 RX ADMIN — VANCOMYCIN HYDROCHLORIDE 750 MG: 10 INJECTION, POWDER, LYOPHILIZED, FOR SOLUTION INTRAVENOUS at 17:07

## 2018-01-09 RX ADMIN — ENOXAPARIN SODIUM 50 MG: 60 INJECTION SUBCUTANEOUS at 09:27

## 2018-01-09 RX ADMIN — METOPROLOL TARTRATE 5 MG: 5 INJECTION, SOLUTION INTRAVENOUS at 02:37

## 2018-01-09 RX ADMIN — INSULIN LISPRO 10 UNITS: 100 INJECTION, SOLUTION INTRAVENOUS; SUBCUTANEOUS at 22:23

## 2018-01-09 RX ADMIN — DILTIAZEM HYDROCHLORIDE 10 MG/HR: 5 INJECTION INTRAVENOUS at 02:48

## 2018-01-09 RX ADMIN — METOPROLOL TARTRATE 5 MG: 5 INJECTION, SOLUTION INTRAVENOUS at 15:54

## 2018-01-09 RX ADMIN — CARVEDILOL 6.25 MG: 6.25 TABLET, FILM COATED ORAL at 20:29

## 2018-01-09 RX ADMIN — LEVOTHYROXINE SODIUM 25 MCG: 25 TABLET ORAL at 09:27

## 2018-01-09 RX ADMIN — METOPROLOL TARTRATE 5 MG: 5 INJECTION, SOLUTION INTRAVENOUS at 09:27

## 2018-01-09 RX ADMIN — MEROPENEM 500 MG: 1 INJECTION, POWDER, FOR SOLUTION INTRAVENOUS at 23:18

## 2018-01-09 RX ADMIN — ASPIRIN 81 MG CHEWABLE TABLET 81 MG: 81 TABLET CHEWABLE at 09:27

## 2018-01-09 ASSESSMENT — PAIN SCALES - GENERAL
PAINLEVEL_OUTOF10: 0

## 2018-01-09 NOTE — PROGRESS NOTES
[id number] [facility]  Progress Note                            Date:  1/9/2018  Patient: Virginia Osei  Admission:  1/5/2018  9:56 PM  Admit DX: Sepsis ([facility] [address] [A41.9]  Age:  80 y.o., 3/8/1925     LOS: 3 days     Reason for evaluation:   atrial fibrillation      SUBJECTIVE:    The patient was seen and examined. Notes and labs reviewed. There were not complications over night. More alert now, able to take pills. No complaints noted. OBJECTIVE:    Telemetry: Atrial fibrillation  /71   Pulse 102   Temp 97 °F (36.1 °C) (Temporal)   Resp 20   Ht 5' 2\" (1.575 m)   Wt 115 lb 6.4 oz (52.3 kg)   SpO2 93%   BMI 21.11 kg/m²     Intake/Output Summary (Last 24 hours) at 01/09/18 1101  Last data filed at 01/09/18 0923   Gross per 24 hour   Intake          2373.08 ml   Output              590 ml   Net          1783.08 ml           Labs:   CBC:   Recent Labs      01/08/18   0507  01/09/18   0058   WBC  14.3*  11.5*   HGB  8.5*  8.3*   HCT  26.9*  26.3*   PLT  199  185     BMP: Recent Labs      01/08/18   0507  01/09/18   0058   NA  134*  132*   K  4.0  3.7   CO2  20*  20*   BUN  52*  45*   CREATININE  2.1*  2.0*   LABGLOM  22*  23*   GLUCOSE  173*  201*     BNP: No results for input(s): BNP in the last 72 hours. PT/INR:   Recent Labs      01/07/18   0205   PROTIME  18.6*   INR  1.55*     APTT:  Recent Labs      01/07/18   0205   APTT  41.7*     CARDIAC ENZYMES:No results for input(s): CKTOTAL, CKMB, CKMBINDEX, TROPONINI in the last 72 hours. FASTING LIPID PANEL:No results found for: HDL, LDLDIRECT, LDLCALC, TRIG  LIVER PROFILE:No results for input(s): AST, ALT, LABALBU in the last 72 hours. NURSE:  Gayathri To RN       Reason for initial evaluation    Atrial Fibrillation      History of the Present Illness and Today's Current Status: Patient more alert today, now able to take pills by mouth. Remains on Cardizem drip, with rate better controlled.       Additionally, positive for - normal mood and affect, alert and orientated x 3, judgement and insight appear appropriate       Current Inpatient Medications:   meropenem  500 mg Intravenous Q12H    vancomycin  750 mg Intravenous Once    metoprolol  5 mg Intravenous Q6H    insulin lispro  0-6 Units Subcutaneous TID WC    insulin lispro  0-3 Units Subcutaneous Nightly    enoxaparin  1 mg/kg Subcutaneous Daily    lidocaine 1 % injection  5 mL Intradermal Once    sodium chloride flush  10 mL Intravenous 2 times per day    atorvastatin  40 mg Oral Daily    pantoprazole sodium  40 mg Oral QAM AC    levothyroxine  25 mcg Oral BID    aspirin  81 mg Oral Daily    insulin glargine  5 Units Subcutaneous Nightly    carvedilol  6.25 mg Oral BID    sodium chloride flush  10 mL Intravenous 2 times per day    levofloxacin  750 mg Intravenous Once    vancomycin (VANCOCIN) intermittent dosing (placeholder)   Other RX Placeholder       IV Infusions (if any):   dextrose      sodium chloride 75 mL/hr at 01/09/18 0242    diltiazem (CARDIZEM) 125 mg in dextrose 5% 125 mL infusion 10 mg/hr (01/09/18 0248)         LABORATORY EVALUATION & TESTING:    I have personally reviewed and interpreted the results of the following diagnostic testing and noted in the nurse's note above      EKG and or Telemetry:  which was personally reviewed me:  Atrial fibrillation rhythm,   Pulse Readings from Last 1 Encounters:   01/09/18 102           ALL THE CARDIOLOGY PROBLEMS ARE LISTED ABOVE; HOWEVER, THE FOLLOWING SPECIFIC CARDIAC PROBLEMS WERE ADDRESSED AND TREATED DURING [facility] VISIT TODAY:                    Cardiac Specific Problem / Diagnosis   Discussion and Data Reviewed Diagnostic Procedures Ordered Management Options Selected                 1. Atrial fibrillation with rapid ventricular response  show no change Review and summation of old records:     On Crdizem drip  No Continue current medications:      Yes: Add Lopressor PO                 2.

## 2018-01-09 NOTE — PLAN OF CARE
Problem: Nutrition  Goal: Optimal nutrition therapy  Nutrition Problem: Inadequate oral intake  Intervention: Food and/or Nutrient Delivery: Continue current diet  Nutritional Goals: meet nutritional needs through po intake or KATYA    Outcome: Ongoing

## 2018-01-09 NOTE — PROGRESS NOTES
Speech Language Pathology  Facility/Department: [facility] PROGRESSIVE CARE  SWALLOW THERAPY    NAME: Mika Foster  : 3/8/1925  MRN: [id number]    Date of Treat: 2018  Evaluating Therapist: Migue Pizarro    Current Diet level:  Current Diet :  (Dental soft)  Current Liquid Diet : Nectar    Reason for Referral  Mika Foster was referred for a bedside swallow evaluation to assess the efficiency of her swallow function, identify signs and symptoms of aspiration and make recommendations regarding safe dietary consistencies, effective compensatory strategies, and safe eating environment. Impression  Observed patient's swallowing function. Patient exhibits slow, decreased oral prep of more solid consistencies, fast oral transit and suspected swallow delay with even thickened liquids, and mild-moderately decreased and inconsistently sluggish laryngeal elevation for swallow airway protection. Even so, no outward S/S penetration/aspiration was noted with any puree consistency presentation, mechanical soft consistency presentation, or nectar thick liquid presentation administered during lunch this date. At this time, would recommend continuation current diet. Meds crushed in pudding or applesauce. TOTAL FEED. Treatment Plan  Requires SLP Intervention: Yes    Recommended Diet and Intervention  Diet Solids Recommendation: Dental Soft  Liquid Consistency Recommendation: Nectar  Recommended Form of Meds: Crushed in puree as able  Therapeutic Interventions: Patient/Family education, Diet tolerance monitoring, Therapeutic PO trials with SLP    Compensatory Swallowing Strategies  Compensatory Swallowing Strategies: Upright as possible for all oral intake; Total feed;Small bites/sips;Eat/Feed slowly; Alternate solids and liquids; External pacing;Remain upright for 30-45 minutes after meals    Treatment/Goals  Timeframe for Short-term Goals: 1x/day for 3 days  Goal 1: Patient will tolerate dental soft consistency and protection.)  Pharyngeal: No outward S/S penetration/aspiration was noted with any puree consistency presentation, mechanical soft consistency presentation, or nectar thick liquid presentation. Secondary to decreased oral prep and fast oral transit, would recommend continuation current diet. Meds crushed in puree. TOTAL FEED.      Electronically signed by SHILO Larson on 1/9/2018 at 1:16 PM

## 2018-01-09 NOTE — PROGRESS NOTES
Pharmacy Vancomycin Consult     Vancomycin Day: 4  Current Dosing: pulse dosing    Temp max:  97.6    Recent Labs      01/08/18   0507  01/09/18   0058   BUN  52*  45*       Recent Labs      01/08/18   0507  01/09/18   0058   CREATININE  2.1*  2.0*       Recent Labs      01/08/18   0507  01/09/18   0058   WBC  14.3*  11.5*         Intake/Output Summary (Last 24 hours) at 01/09/18 0435  Last data filed at 01/09/18 0200   Gross per 24 hour   Intake 1985 ml   Output 680 ml   Net 1305 ml       Culture Date Source Results   01/05/18 Blood No growth   01/06/18 Blood No growth       Ht Readings from Last 1 Encounters:   01/06/18 5' 2\" (1.575 m)        Wt Readings from Last 1 Encounters:   01/09/18 115 lb 6.4 oz (52.3 kg)         Body mass index is 21.11 kg/m². Estimated Creatinine Clearance: 14 mL/min (based on SCr of 2 mg/dL). Random: 20.3    Assessment/Plan: Will give vancomycin 750 mg IV once this evening. Random level ordered ~36 hours after dose.      Electronically signed by BUDDY Will [facility] on 1/9/2018 at 4:35 AM

## 2018-01-09 NOTE — PLAN OF CARE
Problem: Nutrition  Goal: Optimal nutrition therapy  Nutrition Problem: Inadequate oral intake  Intervention: Food and/or Nutrient Delivery: Continue NPO  Nutritional Goals: meet nutritional needs through po intake or KATYA\     Outcome: Completed Date Met: 01/09/18

## 2018-01-10 ENCOUNTER — APPOINTMENT (OUTPATIENT)
Dept: GENERAL RADIOLOGY | Age: 83
DRG: 871 | End: 2018-01-10
Payer: MEDICARE

## 2018-01-10 LAB
ANION GAP SERPL CALCULATED.3IONS-SCNC: 13 MMOL/L (ref 7–19)
BUN BLDV-MCNC: 36 MG/DL (ref 8–23)
CALCIUM SERPL-MCNC: 7.3 MG/DL (ref 8.2–9.6)
CHLORIDE BLD-SCNC: 103 MMOL/L (ref 98–111)
CO2: 20 MMOL/L (ref 22–29)
CREAT SERPL-MCNC: 1.9 MG/DL (ref 0.5–0.9)
GFR NON-AFRICAN AMERICAN: 25
GLUCOSE BLD-MCNC: 124 MG/DL (ref 74–109)
GLUCOSE BLD-MCNC: 150 MG/DL (ref 70–99)
GLUCOSE BLD-MCNC: 197 MG/DL (ref 70–99)
GLUCOSE BLD-MCNC: 81 MG/DL (ref 70–99)
GLUCOSE BLD-MCNC: 82 MG/DL (ref 70–99)
HCT VFR BLD CALC: 25.9 % (ref 37–47)
HEMOGLOBIN: 8.2 G/DL (ref 12–16)
MCH RBC QN AUTO: 30.4 PG (ref 27–31)
MCHC RBC AUTO-ENTMCNC: 31.7 G/DL (ref 33–37)
MCV RBC AUTO: 95.9 FL (ref 81–99)
OCCULT BLOOD DIAGNOSTIC: NORMAL
PDW BLD-RTO: 15.2 % (ref 11.5–14.5)
PERFORMED ON: ABNORMAL
PERFORMED ON: ABNORMAL
PERFORMED ON: NORMAL
PERFORMED ON: NORMAL
PLATELET # BLD: 192 K/UL (ref 130–400)
PMV BLD AUTO: 11.9 FL (ref 9.4–12.3)
POTASSIUM SERPL-SCNC: 3.6 MMOL/L (ref 3.5–5)
RBC # BLD: 2.7 M/UL (ref 4.2–5.4)
SODIUM BLD-SCNC: 136 MMOL/L (ref 136–145)
WBC # BLD: 11.8 K/UL (ref 4.8–10.8)

## 2018-01-10 PROCEDURE — 80048 BASIC METABOLIC PNL TOTAL CA: CPT

## 2018-01-10 PROCEDURE — 2700000000 HC OXYGEN THERAPY PER DAY

## 2018-01-10 PROCEDURE — 2580000003 HC RX 258: Performed by: INTERNAL MEDICINE

## 2018-01-10 PROCEDURE — 71046 X-RAY EXAM CHEST 2 VIEWS: CPT

## 2018-01-10 PROCEDURE — 6370000000 HC RX 637 (ALT 250 FOR IP): Performed by: INTERNAL MEDICINE

## 2018-01-10 PROCEDURE — 99232 SBSQ HOSP IP/OBS MODERATE 35: CPT | Performed by: INTERNAL MEDICINE

## 2018-01-10 PROCEDURE — 92526 ORAL FUNCTION THERAPY: CPT

## 2018-01-10 PROCEDURE — 99233 SBSQ HOSP IP/OBS HIGH 50: CPT | Performed by: HOSPITALIST

## 2018-01-10 PROCEDURE — 6360000002 HC RX W HCPCS: Performed by: HOSPITALIST

## 2018-01-10 PROCEDURE — 85027 COMPLETE CBC AUTOMATED: CPT

## 2018-01-10 PROCEDURE — 2580000003 HC RX 258: Performed by: HOSPITALIST

## 2018-01-10 PROCEDURE — 2140000000 HC CCU INTERMEDIATE R&B

## 2018-01-10 PROCEDURE — 6370000000 HC RX 637 (ALT 250 FOR IP): Performed by: HOSPITALIST

## 2018-01-10 PROCEDURE — 2500000003 HC RX 250 WO HCPCS: Performed by: INTERNAL MEDICINE

## 2018-01-10 PROCEDURE — 82948 REAGENT STRIP/BLOOD GLUCOSE: CPT

## 2018-01-10 PROCEDURE — 82272 OCCULT BLD FECES 1-3 TESTS: CPT

## 2018-01-10 RX ORDER — LANOLIN ALCOHOL/MO/W.PET/CERES
1 CREAM (GRAM) TOPICAL DAILY
Status: DISCONTINUED | OUTPATIENT
Start: 2018-01-10 | End: 2018-01-13 | Stop reason: HOSPADM

## 2018-01-10 RX ORDER — LEVOFLOXACIN 250 MG/1
500 TABLET ORAL EVERY OTHER DAY
Status: DISCONTINUED | OUTPATIENT
Start: 2018-01-12 | End: 2018-01-13 | Stop reason: HOSPADM

## 2018-01-10 RX ORDER — LEVOFLOXACIN 750 MG/1
750 TABLET ORAL ONCE
Status: COMPLETED | OUTPATIENT
Start: 2018-01-10 | End: 2018-01-10

## 2018-01-10 RX ORDER — FERROUS SULFATE 220 (44)/5
220 ELIXIR ORAL DAILY
Status: DISCONTINUED | OUTPATIENT
Start: 2018-01-10 | End: 2018-01-13 | Stop reason: HOSPADM

## 2018-01-10 RX ADMIN — CARVEDILOL 6.25 MG: 6.25 TABLET, FILM COATED ORAL at 10:04

## 2018-01-10 RX ADMIN — METOPROLOL TARTRATE 5 MG: 5 INJECTION, SOLUTION INTRAVENOUS at 03:31

## 2018-01-10 RX ADMIN — CARVEDILOL 6.25 MG: 6.25 TABLET, FILM COATED ORAL at 20:26

## 2018-01-10 RX ADMIN — MEROPENEM 500 MG: 1 INJECTION, POWDER, FOR SOLUTION INTRAVENOUS at 23:36

## 2018-01-10 RX ADMIN — LEVOFLOXACIN 750 MG: 750 TABLET, FILM COATED ORAL at 16:24

## 2018-01-10 RX ADMIN — Medication 10 ML: at 20:28

## 2018-01-10 RX ADMIN — INSULIN GLARGINE 5 UNITS: 100 INJECTION, SOLUTION SUBCUTANEOUS at 21:29

## 2018-01-10 RX ADMIN — MEROPENEM 500 MG: 1 INJECTION, POWDER, FOR SOLUTION INTRAVENOUS at 10:28

## 2018-01-10 RX ADMIN — Medication 10 ML: at 20:27

## 2018-01-10 RX ADMIN — FOLIC ACID TAB 400 MCG 1 MG: 400 TAB at 16:29

## 2018-01-10 RX ADMIN — PANTOPRAZOLE SODIUM 40 MG: 40 GRANULE, DELAYED RELEASE ORAL at 06:17

## 2018-01-10 RX ADMIN — LEVOTHYROXINE SODIUM 25 MCG: 25 TABLET ORAL at 10:04

## 2018-01-10 RX ADMIN — INSULIN LISPRO 4 UNITS: 100 INJECTION, SOLUTION INTRAVENOUS; SUBCUTANEOUS at 21:30

## 2018-01-10 RX ADMIN — ATORVASTATIN CALCIUM 40 MG: 40 TABLET, FILM COATED ORAL at 10:04

## 2018-01-10 RX ADMIN — ASPIRIN 81 MG CHEWABLE TABLET 81 MG: 81 TABLET CHEWABLE at 10:04

## 2018-01-10 RX ADMIN — Medication 220 MG: at 16:30

## 2018-01-10 RX ADMIN — ENOXAPARIN SODIUM 50 MG: 60 INJECTION SUBCUTANEOUS at 10:04

## 2018-01-10 RX ADMIN — LEVOTHYROXINE SODIUM 25 MCG: 25 TABLET ORAL at 20:26

## 2018-01-10 ASSESSMENT — PAIN SCALES - GENERAL
PAINLEVEL_OUTOF10: 0

## 2018-01-10 NOTE — PROGRESS NOTES
Speech Language Pathology  Facility/Department: [facility] PROGRESSIVE CARE  Dysphagia Daily Treatment Note    NAME: Virginia Osei  : 3/8/1925  MRN: [id number]    Patient Diagnosis(es):   Patient Active Problem List    Diagnosis Date Noted    Hyponatremia 2018    Atrial fibrillation with RVR ([address] 2018    Hypomagnesemia 2018    Alzheimer disease 2018    CAD (coronary artery disease) 2018    Pneumonia due to infectious organism 2018    Iron deficiency anemia 2017    Acute GI bleeding 2017    Chronic atrial fibrillation (HCC) 2017    Hypokalemia 2017    Altered mental status     Elevated troponin     ST elevation myocardial infarction involving left anterior descending (LAD) coronary artery ([address] 2017    Aspiration pneumonia of right lung due to gastric secretions ([address] 2017    Sepsis ([address] 2017    Late onset Alzheimer's disease without behavioral disturbance 2017    Acute on chronic combined systolic and diastolic heart failure ([address] 2017    Type 2 diabetes mellitus without complication ([address]     LYDIA (acute kidney injury) ([address] 2017    Hyperkalemia 2017    Encephalopathy 2017     Narrative   EXAMINATION:  XR CHEST STANDARD TWO VW  1/10/2018 8:08 AM   HISTORY: Shortness of breath. Report: Comparison is made with the study from 2018. The right subclavian central line appears in good position as before. No pneumothorax is identified. There is moderate increase in   infiltrative opacities in the right perihilar region and persistent   nonconsolidating infiltrate in the right base. The left lung is clear. The heart is enlarged. No pleural effusion is identified. No other   change.       Impression   Increasing right perihilar infiltrate, which may represent   worsening pneumonia or developing asymmetric pulmonary edema. The   heart is enlarged.  The right-sided clavian central line remains in   good position. No pneumothorax is identified. Signed by Dr Skylar England. Lorraine on 1/10/2018 8:11 AM           Interval History:  79 yo female admitted 1/6/17 with AMS, acutely nonverbal, staring for 15 min, recurrent and came to ER, lactic acidosis noted, abdomen tender, CT showed diverticulosis. She was noted to be in AF RVR, SOA, addressed with diltiazem infusion, is from home, DNR. She was felt to be septic, has severe systolic CHF with EF 24-53% and ACEi, aldactone, BB all appropriate to use when able. Allergies: No Known Allergies    Diet level :  Dental soft, nectar thick liquids    Pain:  Pain Assessment  Patient Currently in Pain: Denies  Pain Assessment: 0-10  Pain Level: 0  PAINAD (Pain Assessment in Advance Dementia)  Breathing: normal  Negative Vocalization: occasional moan/groan, low speech, negative/disapproving quality  Facial Expression: smiling or inexpressive  Body Language: tense, distressed pacing, fidgeting  Consolability: no need to console  PAINAD Score: 2    Diet Tolerance:  Patient tolerating current diet level without signs/symptoms of penetration/aspiration. P.O. Trials: The patient's son was present for today's session. He believes she was receiving a regular diet with thin liquids prior to admission. He states she has dentures, but does not always wear these during oral intake. Today, she is confused. She believes she went home yesterday and returned today. She was presented with thin liquids via cup and straw and weak hyolaryngeal elevation was noted. Clear vocal quality was noted following all trials. Pt was presented with crushed meds in puree and she was able to clear this from the oral cavity without difficulty. Swallow response was timely and no overt s/s of aspiration were observed. She was also presented with nectar thick liquids via cup and soft solids. Timely swallow response was again observed and no overt s/s of aspiration noted.  The

## 2018-01-10 NOTE — PROGRESS NOTES
[facility] [place] [facility]  Progress Note                            Date:  1/10/2018  Patient: Cookie Bahena  Admission:  1/5/2018  9:56 PM  Admit DX: Sepsis ([address] [A41.9]  Age:  80 y.o., 3/8/1925     LOS: 4 days     Reason for evaluation:   atrial fibrillation      SUBJECTIVE:    The patient was seen and examined. Notes and labs reviewed. There were not complications over night. No new complaints, remains in atrial fibrillation with controlled rate. Pulled on central line during the night per the nursing staff. OBJECTIVE:    Telemetry: Atrial fibrillation  /85   Pulse 106   Temp 97 °F (36.1 °C) (Temporal)   Resp 18   Ht 5' 2\" (1.575 m)   Wt 117 lb 12.8 oz (53.4 kg)   SpO2 95%   BMI 21.55 kg/m²     Intake/Output Summary (Last 24 hours) at 01/10/18 1022  Last data filed at 01/10/18 0846   Gross per 24 hour   Intake           1197.5 ml   Output             1525 ml   Net           -327.5 ml           Labs:   CBC:   Recent Labs      01/09/18   0058  01/09/18   1728  01/10/18   0100   WBC  11.5*   --   11.8*   HGB  8.3*   --   8.2*   HCT  26.3*  27.4*  25.9*   PLT  185   --   192     BMP: Recent Labs      01/09/18   0058  01/10/18   0100   NA  132*  136   K  3.7  3.6   CO2  20*  20*   BUN  45*  36*   CREATININE  2.0*  1.9*   LABGLOM  23*  25*   GLUCOSE  201*  124*     BNP: No results for input(s): BNP in the last 72 hours. PT/INR: No results for input(s): PROTIME, INR in the last 72 hours. APTT:No results for input(s): APTT in the last 72 hours. CARDIAC ENZYMES:No results for input(s): CKTOTAL, CKMB, CKMBINDEX, TROPONINI in the last 72 hours. FASTING LIPID PANEL:No results found for: HDL, LDLDIRECT, LDLCALC, TRIG  LIVER PROFILE:No results for input(s): AST, ALT, LABALBU in the last 72 hours.     NURSE:  Omkar Hendrix RN       Reason for initial evaluation    Atrial fibrillation      History of the Present Illness and Today's Current Status: No new complaints, remains in atrial cranial nerves, II-XII, are normal  SKIN - turgor is normal, no rash  PSYCHIATRIC - normal mood and affect, alert and orientated x 3, judgement and insight appear appropriate             Current Inpatient Medications:   [START ON 1/12/2018] levofloxacin  500 mg Oral Every Other Day    folic acid  1 mg Oral Daily    ferrous sulfate  220 mg Oral Daily    meropenem  500 mg Intravenous Q12H    insulin lispro  0-35 Units Subcutaneous 4x Daily AC & HS    enoxaparin  1 mg/kg Subcutaneous Daily    lidocaine 1 % injection  5 mL Intradermal Once    sodium chloride flush  10 mL Intravenous 2 times per day    atorvastatin  40 mg Oral Daily    pantoprazole sodium  40 mg Oral QAM AC    levothyroxine  25 mcg Oral BID    aspirin  81 mg Oral Daily    insulin glargine  5 Units Subcutaneous Nightly    carvedilol  6.25 mg Oral BID    sodium chloride flush  10 mL Intravenous 2 times per day    vancomycin (VANCOCIN) intermittent dosing (placeholder)   Other RX Placeholder       IV Infusions (if any):   dextrose           LABORATORY EVALUATION & TESTING:    I have personally reviewed and interpreted the results of the following diagnostic testing and noted in the nurse's note above      EKG and or Telemetry:  which was personally reviewed me:  Atrial fibrillation rhythm,   Pulse Readings from Last 1 Encounters:   01/10/18 100           ALL THE CARDIOLOGY PROBLEMS ARE LISTED ABOVE; HOWEVER, THE FOLLOWING SPECIFIC CARDIAC PROBLEMS WERE ADDRESSED AND TREATED DURING THE HOSPITAL VISIT TODAY:                    Cardiac Specific Problem / Diagnosis   Discussion and Data Reviewed Diagnostic Procedures Ordered Management Options Selected                 1. Atrial fibrillation with rapid ventricular response  show no change Review and summation of old records:     Coreg No Continue current medications:      Yes:                  2. Elevated BNP level and congestive heart failure.  Initial presentation during this evaluation 9/7/17 Echo Summary   Normal left ventricular size with severe impairment of LV function and an   estimated ejection fraction of approximately 10-15%.   Severe global hypokinesis noted.   Mild concentric left ventricular hypertrophy noted.   No evidence of left ventricular mass or thrombus noted.   Cannot accurately assess diastolic function due to abnormal rhythm. Yes Continue current medications:     Yes                 3. Altered mental status Initial presentation during this evaluation dementia No Continue current medications:        Yes                PLAN:    1. Continue present medications except for changes as noted above  2. Continue to monitor rhythm  3. Further orders per clinical course. 4. Continue Coreg             Discussed with patient and nursing.       Electronically signed by Wes Pereyra MD on 1/10/18 at 4:38 PM    [facility] [place]

## 2018-01-10 NOTE — PROGRESS NOTES
08:02  Per Dr. Ravinder Luz put in a peripheral line           PCA discovered patient pulled out her IJ during the night , there was no trauma or blood loss noted. I will notify physician if this to see what he would like to do for IV access.

## 2018-01-10 NOTE — PROGRESS NOTES
No focal neurologic deficits, normal sensation, alert and oriented, affect and mood appropriate. Skin: no rashes, nodules. Assessment and Plan:   Principal Problem:    Sepsis - culture (-) on empiric Levaquin, Merrem, Vancomycin, clinically likely CAP - continue PO levaquin  Active Problems:    Pneumonia due to infectious organism - continue levaquin / Jossue Else / Vancomycin - narrow to Levaquin is    clinically doing well / (-) Cx. Type 2 diabetes mellitus without complication - POC, lispro    LYDIA - cautious IVF, may need a touch of diuresis based on CXR and I/O, NA    Hyperkalemia - resolved    Encephalopathy - POA    Atrial fibrillation with RVR - controlled VR now with PO coreg, defer to Cardiology    Hypomagnesemia - replaced    Alzheimer disease - POA     CAD - medical management    Anemia - likely multifactoral, inflammatory with iron and folate deficiency components - supplement    Hypothyroid - Synthroid replacement    Hyponatremia - likely volume excess, diurese gently    Advance Directive: DNR-CC    DVT prophylaxis: Full dose lovenox for AF    Discharge planning: TBD    MDM: she is feeling better, likely a little volume overloaded so will diurese gently, monitor clinically and follow labs, transition to PO antibiotics tomorrow if doing well and D/C the next day if stable and VR controlled on PO rx, IV Cardizem now off. Defer to Cardiology re AF-RVR, HR right now is 100 with Coreg 6.25 BID. CXR likely represents pneumonia; though with her EF, edema possible. Discussed CVA prophylaxis with Dr Candido Spatz today (1/11/18) on full dose lovenox at present, will consider oral agent and at age 80 carefully evaluate risk benefit ratio. She is clinically stable.     Svitlana Rice MD  Rounding Hospitalist

## 2018-01-10 NOTE — PLAN OF CARE
Problem: Pain:  Goal: Pain level will decrease  Pain level will decrease   Outcome: Ongoing    Goal: Control of acute pain  Control of acute pain   Outcome: Ongoing    Goal: Control of chronic pain  Control of chronic pain   Outcome: Ongoing      Problem: Falls - Risk of  Goal: Absence of falls  Outcome: Ongoing      Problem: Risk for Impaired Skin Integrity  Goal: Tissue integrity - skin and mucous membranes  Structural intactness and normal physiological function of skin and  mucous membranes.    Outcome: Ongoing      Problem: Nutrition  Goal: Optimal nutrition therapy  Nutrition Problem: Inadequate oral intake  Intervention: Food and/or Nutrient Delivery: Continue current diet  Nutritional Goals: meet nutritional needs through po intake or KATYA     Outcome: Ongoing      Problem: Cardiac Output - Decreased:  Goal: Hemodynamic stability will improve  Hemodynamic stability will improve   Outcome: Ongoing

## 2018-01-11 ENCOUNTER — APPOINTMENT (OUTPATIENT)
Dept: GENERAL RADIOLOGY | Age: 83
DRG: 871 | End: 2018-01-11
Payer: MEDICARE

## 2018-01-11 LAB
ANION GAP SERPL CALCULATED.3IONS-SCNC: 12 MMOL/L (ref 7–19)
BLOOD CULTURE, ROUTINE: NORMAL
BLOOD CULTURE, ROUTINE: NORMAL
BUN BLDV-MCNC: 30 MG/DL (ref 8–23)
CALCIUM SERPL-MCNC: 7.9 MG/DL (ref 8.2–9.6)
CHLORIDE BLD-SCNC: 106 MMOL/L (ref 98–111)
CO2: 21 MMOL/L (ref 22–29)
CREAT SERPL-MCNC: 1.6 MG/DL (ref 0.5–0.9)
CULTURE, BLOOD 2: NORMAL
CULTURE, BLOOD 2: NORMAL
GFR NON-AFRICAN AMERICAN: 30
GLUCOSE BLD-MCNC: 105 MG/DL (ref 70–99)
GLUCOSE BLD-MCNC: 127 MG/DL (ref 70–99)
GLUCOSE BLD-MCNC: 150 MG/DL (ref 70–99)
GLUCOSE BLD-MCNC: 230 MG/DL (ref 70–99)
GLUCOSE BLD-MCNC: 62 MG/DL (ref 74–109)
HCT VFR BLD CALC: 29.6 % (ref 37–47)
HEMOGLOBIN: 9.3 G/DL (ref 12–16)
MCH RBC QN AUTO: 30.1 PG (ref 27–31)
MCHC RBC AUTO-ENTMCNC: 31.4 G/DL (ref 33–37)
MCV RBC AUTO: 95.8 FL (ref 81–99)
PDW BLD-RTO: 15.6 % (ref 11.5–14.5)
PERFORMED ON: ABNORMAL
PLATELET # BLD: 222 K/UL (ref 130–400)
PMV BLD AUTO: 11.7 FL (ref 9.4–12.3)
POTASSIUM SERPL-SCNC: 3.6 MMOL/L (ref 3.5–5)
RBC # BLD: 3.09 M/UL (ref 4.2–5.4)
SODIUM BLD-SCNC: 139 MMOL/L (ref 136–145)
VANCOMYCIN RANDOM: 21.2 UG/ML
WBC # BLD: 12.9 K/UL (ref 4.8–10.8)

## 2018-01-11 PROCEDURE — 2580000003 HC RX 258: Performed by: INTERNAL MEDICINE

## 2018-01-11 PROCEDURE — 2140000000 HC CCU INTERMEDIATE R&B

## 2018-01-11 PROCEDURE — 80048 BASIC METABOLIC PNL TOTAL CA: CPT

## 2018-01-11 PROCEDURE — 85027 COMPLETE CBC AUTOMATED: CPT

## 2018-01-11 PROCEDURE — 6370000000 HC RX 637 (ALT 250 FOR IP): Performed by: INTERNAL MEDICINE

## 2018-01-11 PROCEDURE — 80202 ASSAY OF VANCOMYCIN: CPT

## 2018-01-11 PROCEDURE — 6370000000 HC RX 637 (ALT 250 FOR IP): Performed by: HOSPITALIST

## 2018-01-11 PROCEDURE — 99233 SBSQ HOSP IP/OBS HIGH 50: CPT | Performed by: INTERNAL MEDICINE

## 2018-01-11 PROCEDURE — 6360000002 HC RX W HCPCS: Performed by: HOSPITALIST

## 2018-01-11 PROCEDURE — 2580000003 HC RX 258: Performed by: HOSPITALIST

## 2018-01-11 PROCEDURE — 36415 COLL VENOUS BLD VENIPUNCTURE: CPT

## 2018-01-11 PROCEDURE — 99232 SBSQ HOSP IP/OBS MODERATE 35: CPT | Performed by: HOSPITALIST

## 2018-01-11 PROCEDURE — 99233 SBSQ HOSP IP/OBS HIGH 50: CPT | Performed by: HOSPITALIST

## 2018-01-11 PROCEDURE — 71046 X-RAY EXAM CHEST 2 VIEWS: CPT

## 2018-01-11 PROCEDURE — 82948 REAGENT STRIP/BLOOD GLUCOSE: CPT

## 2018-01-11 PROCEDURE — 92526 ORAL FUNCTION THERAPY: CPT

## 2018-01-11 RX ORDER — CARVEDILOL 6.25 MG/1
12.5 TABLET ORAL 2 TIMES DAILY
Status: DISCONTINUED | OUTPATIENT
Start: 2018-01-11 | End: 2018-01-13 | Stop reason: HOSPADM

## 2018-01-11 RX ORDER — CARVEDILOL 6.25 MG/1
6.25 TABLET ORAL ONCE
Status: COMPLETED | OUTPATIENT
Start: 2018-01-11 | End: 2018-01-11

## 2018-01-11 RX ORDER — DOPAMINE HYDROCHLORIDE 160 MG/100ML
INJECTION, SOLUTION INTRAVENOUS
Status: DISPENSED
Start: 2018-01-11 | End: 2018-01-11

## 2018-01-11 RX ADMIN — Medication 10 ML: at 08:58

## 2018-01-11 RX ADMIN — CARVEDILOL 6.25 MG: 6.25 TABLET, FILM COATED ORAL at 08:57

## 2018-01-11 RX ADMIN — FOLIC ACID TAB 400 MCG 1 MG: 400 TAB at 08:57

## 2018-01-11 RX ADMIN — INSULIN GLARGINE 5 UNITS: 100 INJECTION, SOLUTION SUBCUTANEOUS at 22:18

## 2018-01-11 RX ADMIN — ATORVASTATIN CALCIUM 40 MG: 40 TABLET, FILM COATED ORAL at 08:57

## 2018-01-11 RX ADMIN — LEVOTHYROXINE SODIUM 25 MCG: 25 TABLET ORAL at 08:57

## 2018-01-11 RX ADMIN — LEVOTHYROXINE SODIUM 25 MCG: 25 TABLET ORAL at 20:04

## 2018-01-11 RX ADMIN — ASPIRIN 81 MG CHEWABLE TABLET 81 MG: 81 TABLET CHEWABLE at 08:57

## 2018-01-11 RX ADMIN — Medication 10 ML: at 20:05

## 2018-01-11 RX ADMIN — MEROPENEM 500 MG: 1 INJECTION, POWDER, FOR SOLUTION INTRAVENOUS at 23:05

## 2018-01-11 RX ADMIN — MEROPENEM 500 MG: 1 INJECTION, POWDER, FOR SOLUTION INTRAVENOUS at 11:24

## 2018-01-11 RX ADMIN — Medication 10 ML: at 20:04

## 2018-01-11 RX ADMIN — CARVEDILOL 12.5 MG: 6.25 TABLET, FILM COATED ORAL at 20:04

## 2018-01-11 RX ADMIN — ENOXAPARIN SODIUM 60 MG: 60 INJECTION SUBCUTANEOUS at 08:57

## 2018-01-11 RX ADMIN — PANTOPRAZOLE SODIUM 40 MG: 40 GRANULE, DELAYED RELEASE ORAL at 06:07

## 2018-01-11 RX ADMIN — Medication 220 MG: at 08:57

## 2018-01-11 RX ADMIN — INSULIN LISPRO 6 UNITS: 100 INJECTION, SOLUTION INTRAVENOUS; SUBCUTANEOUS at 20:09

## 2018-01-11 RX ADMIN — CARVEDILOL 6.25 MG: 6.25 TABLET, FILM COATED ORAL at 11:47

## 2018-01-11 ASSESSMENT — PAIN SCALES - GENERAL
PAINLEVEL_OUTOF10: 0

## 2018-01-11 NOTE — PROGRESS NOTES
Was called to the pt's room to assess 02 sat. While in room I noticed that the pt had pulled out her blue. The tip of the blue, bulb still inflated, was laying beside the pt. Small amounts of blood found in patients radha area.  Notifying hospitalist

## 2018-01-11 NOTE — PROGRESS NOTES
Speech Language Pathology  Facility/Department: [facility] PROGRESSIVE CARE  Dysphagia Daily Treatment Note    NAME: Juan Martinez  : 3/8/1925  MRN: [id number]    Patient Diagnosis(es):   Patient Active Problem List    Diagnosis Date Noted    Hyponatremia 2018    Atrial fibrillation with RVR ([address] 2018    Hypomagnesemia 2018    Alzheimer disease 2018    CAD (coronary artery disease) 2018    Pneumonia due to infectious organism 2018    Iron deficiency anemia 2017    Acute GI bleeding 2017    Chronic atrial fibrillation (HCC) 2017    Hypokalemia 2017    Altered mental status     Elevated troponin     ST elevation myocardial infarction involving left anterior descending (LAD) coronary artery ([address] 2017    Aspiration pneumonia of right lung due to gastric secretions ([address] 2017    Sepsis ([address] 2017    Late onset Alzheimer's disease without behavioral disturbance 2017    Acute on chronic combined systolic and diastolic heart failure ([address] 2017    Type 2 diabetes mellitus without complication ([address] 15/0488    LYDIA (acute kidney injury) ([address] 2017    Hyperkalemia 2017    Encephalopathy 2017     Allergies: No Known Allergies  Onset Date: 18  Diet level prior to BSE:  CURRENTLY ON DENTAL SOFT WITH NECTAR THICK LIQUID    Pain:  Pain Assessment  Patient Currently in Pain: Denies  Pain Assessment: 0-10  Pain Level: 0  PAINAD (Pain Assessment in Advance Dementia)  Breathing: normal  Negative Vocalization: occasional moan/groan, low speech, negative/disapproving quality  Facial Expression: smiling or inexpressive  Body Language: tense, distressed pacing, fidgeting  Consolability: no need to console  PAINAD Score: 2    Diet Tolerance:  Patient tolerating current diet level with MIN signs/symptoms of penetration/aspiration. P.O. Trials:   Thin       Nectar    VIA CUP, WEAK LARYNGEAL ELEVATION

## 2018-01-11 NOTE — CARE COORDINATION
Pt's Dtr was present at bedside and assisted with completion of dc assessment for pt. Pt resides in the home with her 2 sons and their s/o's with 24/7 supervision and intends to dc to the same location. Pt does not have any in home services prior to admission. Pt has DME in the home, including a walker but does not ambulate at all and family uses a w/c for pt. Dtr states pt has no issues affording meds and receives Rx's through mail order and [employer] when necessary. Dtr did not identify any further dc needs at this time. SW will continue to follow and assess further dc needs.

## 2018-01-11 NOTE — PROGRESS NOTES
Pharmacy Vancomycin Consult     Vancomycin Day: 6  Current Dosing: pulse dosing    Temp max:  99.3    Recent Labs      01/09/18   0058  01/10/18   0100   BUN  45*  36*       Recent Labs      01/09/18   0058  01/10/18   0100   CREATININE  2.0*  1.9*       Recent Labs      01/10/18   0100  01/11/18   0538   WBC  11.8*  12.9*         Intake/Output Summary (Last 24 hours) at 01/11/18 0601  Last data filed at 01/10/18 1520   Gross per 24 hour   Intake 1180 ml   Output 350 ml   Net 830 ml       Culture Date Source Results   01/05/18 Blood No growth   01/06/18 Blood No growth       Ht Readings from Last 1 Encounters:   01/06/18 5' 2\" (1.575 m)        Wt Readings from Last 1 Encounters:   01/11/18 128 lb 8 oz (58.3 kg)         Body mass index is 23.5 kg/m². Estimated Creatinine Clearance: 15 mL/min (based on SCr of 1.9 mg/dL). Random: 21.2    Assessment/Plan: Will hold vancomycin for today. Random level ordered for tomorrow.      Electronically signed by BUDDY Sawyer [facility] on 1/11/2018 at 6:01 AM

## 2018-01-12 LAB
ANION GAP SERPL CALCULATED.3IONS-SCNC: 13 MMOL/L (ref 7–19)
BUN BLDV-MCNC: 31 MG/DL (ref 8–23)
CALCIUM SERPL-MCNC: 7.9 MG/DL (ref 8.2–9.6)
CHLORIDE BLD-SCNC: 107 MMOL/L (ref 98–111)
CO2: 20 MMOL/L (ref 22–29)
CREAT SERPL-MCNC: 1.5 MG/DL (ref 0.5–0.9)
GFR NON-AFRICAN AMERICAN: 32
GLUCOSE BLD-MCNC: 107 MG/DL (ref 74–109)
GLUCOSE BLD-MCNC: 125 MG/DL (ref 70–99)
GLUCOSE BLD-MCNC: 136 MG/DL (ref 70–99)
GLUCOSE BLD-MCNC: 141 MG/DL (ref 70–99)
GLUCOSE BLD-MCNC: 83 MG/DL (ref 70–99)
HCT VFR BLD CALC: 28 % (ref 37–47)
HEMOGLOBIN: 8.8 G/DL (ref 12–16)
MCH RBC QN AUTO: 30.3 PG (ref 27–31)
MCHC RBC AUTO-ENTMCNC: 31.4 G/DL (ref 33–37)
MCV RBC AUTO: 96.6 FL (ref 81–99)
PDW BLD-RTO: 15.8 % (ref 11.5–14.5)
PERFORMED ON: ABNORMAL
PERFORMED ON: NORMAL
PLATELET # BLD: 209 K/UL (ref 130–400)
PMV BLD AUTO: 12.5 FL (ref 9.4–12.3)
POTASSIUM SERPL-SCNC: 3.7 MMOL/L (ref 3.5–5)
RBC # BLD: 2.9 M/UL (ref 4.2–5.4)
SODIUM BLD-SCNC: 140 MMOL/L (ref 136–145)
VANCOMYCIN RANDOM: 16.5 UG/ML
WBC # BLD: 11.6 K/UL (ref 4.8–10.8)

## 2018-01-12 PROCEDURE — 92526 ORAL FUNCTION THERAPY: CPT

## 2018-01-12 PROCEDURE — 99232 SBSQ HOSP IP/OBS MODERATE 35: CPT | Performed by: INTERNAL MEDICINE

## 2018-01-12 PROCEDURE — 6360000002 HC RX W HCPCS: Performed by: HOSPITALIST

## 2018-01-12 PROCEDURE — 6370000000 HC RX 637 (ALT 250 FOR IP): Performed by: HOSPITALIST

## 2018-01-12 PROCEDURE — 6370000000 HC RX 637 (ALT 250 FOR IP): Performed by: INTERNAL MEDICINE

## 2018-01-12 PROCEDURE — 80048 BASIC METABOLIC PNL TOTAL CA: CPT

## 2018-01-12 PROCEDURE — 2580000003 HC RX 258: Performed by: HOSPITALIST

## 2018-01-12 PROCEDURE — 99233 SBSQ HOSP IP/OBS HIGH 50: CPT | Performed by: HOSPITALIST

## 2018-01-12 PROCEDURE — 36415 COLL VENOUS BLD VENIPUNCTURE: CPT

## 2018-01-12 PROCEDURE — 82948 REAGENT STRIP/BLOOD GLUCOSE: CPT

## 2018-01-12 PROCEDURE — 80202 ASSAY OF VANCOMYCIN: CPT

## 2018-01-12 PROCEDURE — 2580000003 HC RX 258: Performed by: INTERNAL MEDICINE

## 2018-01-12 PROCEDURE — 85027 COMPLETE CBC AUTOMATED: CPT

## 2018-01-12 PROCEDURE — 2140000000 HC CCU INTERMEDIATE R&B

## 2018-01-12 RX ORDER — FUROSEMIDE 10 MG/ML
20 INJECTION INTRAMUSCULAR; INTRAVENOUS ONCE
Status: COMPLETED | OUTPATIENT
Start: 2018-01-12 | End: 2018-01-12

## 2018-01-12 RX ADMIN — ENOXAPARIN SODIUM 60 MG: 60 INJECTION SUBCUTANEOUS at 09:16

## 2018-01-12 RX ADMIN — ATORVASTATIN CALCIUM 40 MG: 40 TABLET, FILM COATED ORAL at 09:13

## 2018-01-12 RX ADMIN — Medication 10 ML: at 09:15

## 2018-01-12 RX ADMIN — ASPIRIN 81 MG CHEWABLE TABLET 81 MG: 81 TABLET CHEWABLE at 09:13

## 2018-01-12 RX ADMIN — PANTOPRAZOLE SODIUM 40 MG: 40 GRANULE, DELAYED RELEASE ORAL at 05:56

## 2018-01-12 RX ADMIN — CARVEDILOL 12.5 MG: 6.25 TABLET, FILM COATED ORAL at 09:12

## 2018-01-12 RX ADMIN — CARVEDILOL 12.5 MG: 6.25 TABLET, FILM COATED ORAL at 21:30

## 2018-01-12 RX ADMIN — FUROSEMIDE 20 MG: 10 INJECTION INTRAMUSCULAR; INTRAVENOUS at 14:34

## 2018-01-12 RX ADMIN — Medication 10 ML: at 09:16

## 2018-01-12 RX ADMIN — Medication 10 ML: at 22:29

## 2018-01-12 RX ADMIN — VANCOMYCIN HYDROCHLORIDE 750 MG: 1 INJECTION, POWDER, LYOPHILIZED, FOR SOLUTION INTRAVENOUS at 04:28

## 2018-01-12 RX ADMIN — LEVOTHYROXINE SODIUM 25 MCG: 25 TABLET ORAL at 21:30

## 2018-01-12 RX ADMIN — Medication 220 MG: at 09:12

## 2018-01-12 RX ADMIN — FOLIC ACID TAB 400 MCG 1 MG: 400 TAB at 09:13

## 2018-01-12 RX ADMIN — LEVOTHYROXINE SODIUM 25 MCG: 25 TABLET ORAL at 09:12

## 2018-01-12 RX ADMIN — LEVOFLOXACIN 500 MG: 250 TABLET, FILM COATED ORAL at 09:13

## 2018-01-12 NOTE — PROGRESS NOTES
[facility]  Progress Note                            Date:  1/12/2018  Patient: Dustin Zamora  Admission:  1/5/2018  9:56 PM  Admit DX: Sepsis ([facility], [place] [address] [A41.9]  Age:  80 y.o., 3/8/1925     LOS: 6 days     Reason for evaluation:   atrial fibrillation      SUBJECTIVE:    The patient was seen and examined. Notes and labs reviewed. There were not complications over night. Lying in bed without new complaints. Remains in atrial fibrillation        OBJECTIVE:    Telemetry: Atrial fibrillation  /85   Pulse 97   Temp 96.6 °F (35.9 °C) (Temporal)   Resp 18   Ht 5' 2\" (1.575 m)   Wt 126 lb 14.4 oz (57.6 kg)   SpO2 96%   BMI 23.21 kg/m²     Intake/Output Summary (Last 24 hours) at 01/12/18 1054  Last data filed at 01/12/18 0912   Gross per 24 hour   Intake              650 ml   Output                0 ml   Net              650 ml           Labs:   CBC:   Recent Labs      01/11/18   0538  01/12/18   0152   WBC  12.9*  11.6*   HGB  9.3*  8.8*   HCT  29.6*  28.0*   PLT  222  209     BMP: Recent Labs      01/11/18   0538  01/12/18   0152   NA  139  140   K  3.6  3.7   CO2  21*  20*   BUN  30*  31*   CREATININE  1.6*  1.5*   LABGLOM  30*  32*   GLUCOSE  62*  107     BNP: No results for input(s): BNP in the last 72 hours. PT/INR: No results for input(s): PROTIME, INR in the last 72 hours. APTT:No results for input(s): APTT in the last 72 hours. CARDIAC ENZYMES:No results for input(s): CKTOTAL, CKMB, CKMBINDEX, TROPONINI in the last 72 hours. FASTING LIPID PANEL:No results found for: HDL, LDLDIRECT, LDLCALC, TRIG  LIVER PROFILE:No results for input(s): AST, ALT, LABALBU in the last 72 hours. NURSE:  Bobby Rojas RN      Reason for initial evaluation    Atrial fibrillation      History of the Present Illness and Today's Current Status: Resting without complaints of chest discomfort or shortness of breath.        Additionally, positive for fatigue and irregular heart beat, negative for turgor is normal, no rash  PSYCHIATRIC - normal mood and affect, alert and orientated x 3, judgement and insight appear appropriate        Current Inpatient Medications:   enoxaparin  1 mg/kg Subcutaneous Daily    carvedilol  12.5 mg Oral BID    levofloxacin  500 mg Oral Every Other Day    folic acid  1 mg Oral Daily    ferrous sulfate  220 mg Oral Daily    insulin lispro  0-35 Units Subcutaneous 4x Daily AC & HS    lidocaine 1 % injection  5 mL Intradermal Once    sodium chloride flush  10 mL Intravenous 2 times per day    atorvastatin  40 mg Oral Daily    pantoprazole sodium  40 mg Oral QAM AC    levothyroxine  25 mcg Oral BID    aspirin  81 mg Oral Daily    insulin glargine  5 Units Subcutaneous Nightly    sodium chloride flush  10 mL Intravenous 2 times per day    vancomycin (VANCOCIN) intermittent dosing (placeholder)   Other RX Placeholder       IV Infusions (if any):   dextrose           LABORATORY EVALUATION & TESTING:    I have personally reviewed and interpreted the results of the following diagnostic testing and noted in the nurse's note above      EKG and or Telemetry:  which was personally reviewed me:  Atrial fibrillation rhythm,   Pulse Readings from Last 1 Encounters:   01/12/18 91           ALL THE CARDIOLOGY PROBLEMS ARE LISTED ABOVE; HOWEVER, THE FOLLOWING SPECIFIC CARDIAC PROBLEMS WERE ADDRESSED AND TREATED DURING THE HOSPITAL VISIT TODAY:                    Cardiac Specific Problem / Diagnosis   Discussion and Data Reviewed Diagnostic Procedures Ordered Management Options Selected                 1. Atrial fibrillation with rapid ventricular response  show no change Review and summation of old records:     Coreg No Continue current medications:      Yes: Increase Coreg                 2. Elevated BNP level and congestive heart failure.  Initial presentation during this evaluation 9/7/17 Echo Summary   Normal left ventricular size with severe impairment of LV function and

## 2018-01-12 NOTE — PROGRESS NOTES
Pharmacy Vancomycin Consult     Vancomycin Day: 7  Current Dosing: pulse dosing    Temp max:  98.4    Recent Labs      01/11/18   0538  01/12/18   0152   BUN  30*  31*       Recent Labs      01/11/18   0538  01/12/18   0152   CREATININE  1.6*  1.5*       Recent Labs      01/11/18   0538  01/12/18   0152   WBC  12.9*  11.6*         Intake/Output Summary (Last 24 hours) at 01/12/18 0345  Last data filed at 01/12/18 0200   Gross per 24 hour   Intake 760 ml   Output 0 ml   Net 760 ml       Culture Date Source Results   01/05/18 Blood No growth   01/06/18 Blood No growth       Ht Readings from Last 1 Encounters:   01/06/18 5' 2\" (1.575 m)        Wt Readings from Last 1 Encounters:   01/12/18 126 lb 14.4 oz (57.6 kg)         Body mass index is 23.21 kg/m². Estimated Creatinine Clearance: 19 mL/min (based on SCr of 1.5 mg/dL). Trough: 16.5    Assessment/Plan: Will give vancomycin 750 mg IV x 1. Random level ordered for 1/14.     Electronically signed by BUDDY [facility] on 1/12/2018 at 3:45 AM

## 2018-01-13 VITALS
TEMPERATURE: 98.9 F | SYSTOLIC BLOOD PRESSURE: 129 MMHG | WEIGHT: 124.8 LBS | RESPIRATION RATE: 20 BRPM | HEIGHT: 62 IN | DIASTOLIC BLOOD PRESSURE: 74 MMHG | HEART RATE: 74 BPM | OXYGEN SATURATION: 94 % | BODY MASS INDEX: 22.97 KG/M2

## 2018-01-13 LAB
ANION GAP SERPL CALCULATED.3IONS-SCNC: 11 MMOL/L (ref 7–19)
BUN BLDV-MCNC: 29 MG/DL (ref 8–23)
CALCIUM SERPL-MCNC: 8 MG/DL (ref 8.2–9.6)
CHLORIDE BLD-SCNC: 105 MMOL/L (ref 98–111)
CO2: 22 MMOL/L (ref 22–29)
CREAT SERPL-MCNC: 1.7 MG/DL (ref 0.5–0.9)
GFR NON-AFRICAN AMERICAN: 28
GLUCOSE BLD-MCNC: 131 MG/DL (ref 70–99)
GLUCOSE BLD-MCNC: 145 MG/DL (ref 74–109)
HCT VFR BLD CALC: 29.3 % (ref 37–47)
HEMOGLOBIN: 9.2 G/DL (ref 12–16)
MCH RBC QN AUTO: 30 PG (ref 27–31)
MCHC RBC AUTO-ENTMCNC: 31.4 G/DL (ref 33–37)
MCV RBC AUTO: 95.4 FL (ref 81–99)
PDW BLD-RTO: 16 % (ref 11.5–14.5)
PERFORMED ON: ABNORMAL
PLATELET # BLD: 218 K/UL (ref 130–400)
PMV BLD AUTO: 11.7 FL (ref 9.4–12.3)
POTASSIUM SERPL-SCNC: 4.2 MMOL/L (ref 3.5–5)
RBC # BLD: 3.07 M/UL (ref 4.2–5.4)
SODIUM BLD-SCNC: 138 MMOL/L (ref 136–145)
WBC # BLD: 12.9 K/UL (ref 4.8–10.8)

## 2018-01-13 PROCEDURE — 2580000003 HC RX 258: Performed by: HOSPITALIST

## 2018-01-13 PROCEDURE — 99233 SBSQ HOSP IP/OBS HIGH 50: CPT | Performed by: HOSPITALIST

## 2018-01-13 PROCEDURE — 82948 REAGENT STRIP/BLOOD GLUCOSE: CPT

## 2018-01-13 PROCEDURE — G8997 SWALLOW GOAL STATUS: HCPCS

## 2018-01-13 PROCEDURE — G8996 SWALLOW CURRENT STATUS: HCPCS

## 2018-01-13 PROCEDURE — 6360000002 HC RX W HCPCS: Performed by: HOSPITALIST

## 2018-01-13 PROCEDURE — 80048 BASIC METABOLIC PNL TOTAL CA: CPT

## 2018-01-13 PROCEDURE — 6370000000 HC RX 637 (ALT 250 FOR IP): Performed by: INTERNAL MEDICINE

## 2018-01-13 PROCEDURE — 92526 ORAL FUNCTION THERAPY: CPT

## 2018-01-13 PROCEDURE — 85027 COMPLETE CBC AUTOMATED: CPT

## 2018-01-13 PROCEDURE — 6370000000 HC RX 637 (ALT 250 FOR IP): Performed by: HOSPITALIST

## 2018-01-13 PROCEDURE — 36415 COLL VENOUS BLD VENIPUNCTURE: CPT

## 2018-01-13 RX ORDER — LEVOFLOXACIN 250 MG/1
250 TABLET ORAL DAILY
Qty: 3 TABLET | Refills: 0 | Status: SHIPPED | OUTPATIENT
Start: 2018-01-13 | End: 2018-01-16

## 2018-01-13 RX ORDER — FERROUS SULFATE 220 (44)/5
220 ELIXIR ORAL DAILY
Qty: 1 BOTTLE | Refills: 3 | Status: SHIPPED | OUTPATIENT
Start: 2018-01-14

## 2018-01-13 RX ORDER — FOLIC ACID 1 MG/1
1 TABLET ORAL DAILY
Qty: 30 TABLET | Refills: 3 | Status: SHIPPED | OUTPATIENT
Start: 2018-01-14

## 2018-01-13 RX ADMIN — LEVOTHYROXINE SODIUM 25 MCG: 25 TABLET ORAL at 09:07

## 2018-01-13 RX ADMIN — ASPIRIN 81 MG CHEWABLE TABLET 81 MG: 81 TABLET CHEWABLE at 09:02

## 2018-01-13 RX ADMIN — Medication 10 ML: at 09:03

## 2018-01-13 RX ADMIN — ATORVASTATIN CALCIUM 40 MG: 40 TABLET, FILM COATED ORAL at 09:02

## 2018-01-13 RX ADMIN — Medication 220 MG: at 09:02

## 2018-01-13 RX ADMIN — ENOXAPARIN SODIUM 30 MG: 60 INJECTION SUBCUTANEOUS at 09:01

## 2018-01-13 RX ADMIN — PANTOPRAZOLE SODIUM 40 MG: 40 GRANULE, DELAYED RELEASE ORAL at 06:01

## 2018-01-13 RX ADMIN — CARVEDILOL 12.5 MG: 6.25 TABLET, FILM COATED ORAL at 09:02

## 2018-01-13 RX ADMIN — FOLIC ACID TAB 400 MCG 1 MG: 400 TAB at 09:02

## 2018-01-13 NOTE — PROGRESS NOTES
Hospitalist Progress Note  1/12/2018 6:35 PM  Subjective:   Admit Date: 1/5/2018  PCP: No primary care provider on file. Chief Complaint: Can she go home ? Subjective: Has felt better, transitioning to Levaquin today PO and stopping IV antibiotics. No new complaints. Interval History:     81 yo female admitted 1/6/17 with AMS, acutely nonverbal, staring for 15 min, recurrent and came to ER, lactic acidosis noted, abdomen tender, CT showed diverticulosis. She was noted to be in AF RVR, SOA, addressed with diltiazem infusion, is from home, DNR. She was felt to be septic, has severe systolic CHF with EF 67-23% and ACEi, aldactone, BB all appropriate to use when able. Her potassium has been elevated in the past, monitoring carefully.        ROS: 14 point review of systems is negative except as specifically addressed above. DIET DENTAL SOFT; Carb Control: 4 carbs/meal (approximate 1800 kcals/day);  Nectar Thick    Intake/Output Summary (Last 24 hours) at 01/12/18 1835  Last data filed at 01/12/18 1440   Gross per 24 hour   Intake              610 ml   Output              600 ml   Net               10 ml     Medications:   dextrose       Current Facility-Administered Medications   Medication Dose Route Frequency Provider Last Rate Last Dose    enoxaparin (LOVENOX) injection 60 mg  1 mg/kg Subcutaneous Daily Doe Mckeon MD   60 mg at 01/12/18 0916    carvedilol (COREG) tablet 12.5 mg  12.5 mg Oral BID Solis Bhatti MD   12.5 mg at 01/12/18 0912    levofloxacin (LEVAQUIN) tablet 500 mg  500 mg Oral Every Other Day Connor Guillen MD   500 mg at 97/75/25 7548    folic acid (FOLVITE) tablet 1 mg  1 mg Oral Daily Connor Guillen MD   1 mg at 01/12/18 0913    ferrous sulfate 220 (44 Fe) MG/5ML solution 220 mg  220 mg Oral Daily Connor Guillen MD   220 mg at 01/12/18 0912    insulin lispro (HUMALOG) injection vial 0-35 Units  0-35 Units Subcutaneous 4x Daily AC & HS Connor Guillen MD Recent Labs      01/10/18   0100  01/11/18   0538  01/12/18   0152   NA  136  139  140   K  3.6  3.6  3.7   ANIONGAP  13  12  13   CL  103  106  107   CO2  20*  21*  20*   BUN  36*  30*  31*   CREATININE  1.9*  1.6*  1.5*   GLUCOSE  124*  62*  107   CALCIUM  7.3*  7.9*  7.9*     TSH:    Lab Results   Component Value Date    TSH 3.530 09/07/2017     All Cultures (-) so far     CXR 1/11/18  Impression  A persistent right lung infiltrate. A small bibasilar pleural effusion, more on the right side. Signed by Dr Randall Araujo on 1/11/2018 4:09 PM     CXR 1/10/18  Impression  Increasing right perihilar infiltrate, which may represent worsening pneumonia or developing asymmetric pulmonary edema. The heart is enlarged. The right-sided clavian central line remains in good position. No pneumothorax is identified. Signed by Dr Candace Peters on 1/10/2018 8:11 AM     pCXR 1/7/18  Impression  1. No pneumothorax after central line placement. 2. Interval development of opacities in the right lower lung could be due to atelectasis, pneumonia, or early pulmonary edema. Signed by Dr Tamia Ames on 1/7/2018 7:03 PM     Echo 9/8/17  Summary   Normal left ventricular size with severe impairment of LV function and an  estimated ejection fraction of approximately 10-15%.   Severe global hypokinesis noted.   Mild concentric left ventricular hypertrophy noted.   No evidence of left ventricular mass or thrombus noted.   Cannot accurately assess diastolic function due to abnormal rhythm. Electronically signed by Divine Pedraza MD (Interpreting physician) on 09/11/2017 11:01 AM     CT Abdomen and Pelvis 1/6/18  Impression  1. Mildly enlarged left adrenal gland. 2. Renal cysts  3. Cardiomegaly.   4. Diverticulosis.               Signed by Dr Michela Baird on 1/6/2018 8:25 AM     CT Head 1/5/18  Impression  Changes of aging with no acute intracranial abnormality Signed by Dr Tobias Aguirre on 1/6/2018 7:54 AM     EEG 1/8/18  EEG INTERPRETATION:  Abnormal EEG due to diffuse slowing of the background rhythm at times appearing slightly more prominent   over the right hemisphere.  No overt epileptiform abnormalities were noted. CLINICAL CORRELATION:   This EEG is suggestive of a moderate encephalopathy, nonspecific to etiology. Josseline Brennan DO Board Certified Neurologist Date reported: 1/8/2018 Date signed: 1/8/2018      Objective:   Vitals: /83   Pulse 100   Temp 97.5 °F (36.4 °C) (Temporal)   Resp 16   Ht 5' 2\" (1.575 m)   Wt 126 lb 14.4 oz (57.6 kg)   SpO2 94%   BMI 23.21 kg/m²   24HR INTAKE/OUTPUT:    Intake/Output Summary (Last 24 hours) at 01/12/18 1835  Last data filed at 01/12/18 1440   Gross per 24 hour   Intake              610 ml   Output              600 ml   Net               10 ml     General appearance: alert and cooperative with exam  HEENT: atraumatic, eyes with clear conjunctiva and normal lids, pupils and irises normal, external ears and nose are normal, lips normal. Neck without masses, lympadenopathy, bruit, thyroid normal  Lungs: no increased work of breathing, very few R basilar rales / rhonchi, no wheezes  Heart: irregular rate and rhythm, S1, S2 normal, no murmur, click, rub or gallop  Abdomen: soft, non-tender; bowel sounds normal; no masses,  no organomegaly  Extremities: extremities normal, atraumatic, no cyanosis or edema  Neurologic: No focal neurologic deficits, normal sensation, alert and oriented, affect and mood appropriate. Skin: no rashes, nodules.     Assessment and Plan:     Principal Problem:    Sepsis - culture (-) on empiric Levaquin, Merrem, Vancomycin, clinically likely CAP - continue PO levaquin total rx of 0 days (1/16)  Active Problems:    Pneumonia due to infectious organism -  levaquin / Clementeen Serve / Vancomycin - narrowed to Levaquin; is               clinically doing well / (-) Cx - it may all be fluid but

## 2018-01-13 NOTE — DISCHARGE SUMMARY
Katie Montesinos  :  3/8/1925  MRN:  [id number]    Admit date:  2018  Discharge date: 18    Admitting Physician:  Shayy Talavera DO    Advance Directive: [facility]    Consults: Cardiology, General Surgery    Primary Care Physician:  No primary care provider on file. Discharge Diagnoses:      Principal Problem:    Sepsis - empiric Levaquin, Merrem, Vancomycin, clinically likely CAP; transitioned to PO levaquin total rx of 10 days ()  Active Problems:    Pneumonia due to infectious organism -  levaquin / Aden Benjamin / Vancomycin - transitioned to Levaquin    Chronic Systolic CHF - EF 91%, gentle diuresis     Type 2 diabetes mellitus without complication - POC, lispro - stopped metformin due to LYDIA on CKD    LYDIA - resolved with IVF, may need a touch of diuresis based on CXR and I/O, NA    Hyperkalemia - resolved    Encephalopathy - POA, most likely attributable to pneumonia / dementia    Atrial fibrillation with RVR - controlled VR now with PO coreg, per Cardiology, no CVA prophylaxis: risk > benefit. Hypomagnesemia - replaced    Alzheimer disease - POA, medical management, supportive care    CAD - medical management    Anemia - likely multifactoral, inflammatory with iron and folate deficiency components - supplement    Hypothyroid - Synthroid replacement    Hyponatremia - resolved        Significant Diagnostic Studies:     CXR 18  Impression  A persistent right lung infiltrate. A small bibasilar pleural effusion, more on the right side. Signed by Dr Alphonso Lopez on 2018 4:09 PM     CXR 1/10/18  Impression  Increasing right perihilar infiltrate, which may represent worsening pneumonia or developing asymmetric pulmonary edema. The heart is enlarged. The right-sided clavian central line remains in good position. No pneumothorax is identified. Signed by Dr Adam Edmondson. Lorraine on 1/10/2018 8:11 AM     pCXR 18  Impression  1. No pneumothorax after central line placement.   2. atraumatic, no cyanosis or edema  Neurologic: No focal neurologic deficits, normal sensation, alert and oriented, affect and mood appropriate. Skin: no rashes, nodules. Discharge Medications:       Kate Yao   Home Medication Instructions J    Printed on:18 1111   Medication Information                      aspirin 81 MG chewable tablet  Take 1 tablet by mouth daily             atorvastatin (LIPITOR) 40 MG tablet  Take 40 mg by mouth daily             carvedilol (COREG) 12.5 MG tablet  Take 0.5 tablets by mouth 2 times daily             ferrous sulfate 220 (44 Fe) MG/5ML solution  Take 5 mLs by mouth daily             folic acid (FOLVITE) 1 MG tablet  Take 1 tablet by mouth daily             insulin glargine (LANTUS) 100 UNIT/ML injection vial  Inject 5 Units into the skin nightly             levothyroxine (SYNTHROID) 25 MCG tablet  Take 25 mcg by mouth 2 times daily             pantoprazole sodium (PROTONIX) 40 MG PACK packet  Take 40 mg by mouth every morning (before breakfast)               Discharge Instructions: Follow up with family doctor in 3-5 days. Take medications as directed. Resume activity as tolerated. Diet: DIET DENTAL SOFT; Carb Control: 4 carbs/meal (approximate 1800 kcals/day); Nectar Thick     Disposition: Patient is medically stable and will be discharged home with family to see PCP next week. Time spent on discharge > 30 min.     Signed:  Keyla Davidson MD

## 2018-01-13 NOTE — PROGRESS NOTES
Intramuscular PRN Max Prescott MD        dextrose 5 % solution  100 mL/hr Intravenous PRN Max Prescott MD        vancomycin [facility]) intermittent dosing (placeholder)   Other RX Placeholder Garcia Uriarte MD            Labs:     Recent Labs      01/11/18   7028  01/12/18   0152  01/13/18   0211   WBC  12.9*  11.6*  12.9*   RBC  3.09*  2.90*  3.07*   HGB  9.3*  8.8*  9.2*   HCT  29.6*  28.0*  29.3*   MCV  95.8  96.6  95.4   MCH  30.1  30.3  30.0   MCHC  31.4*  31.4*  31.4*   PLT  222  209  218     Recent Labs      01/11/18   0538  01/12/18   0152  01/13/18 0211   NA  139  140  138   K  3.6  3.7  4.2   ANIONGAP  12  13  11   CL  106  107  105   CO2  21*  20*  22   BUN  30*  31*  29*   CREATININE  1.6*  1.5*  1.7*   GLUCOSE  62*  107  145*   CALCIUM  7.9*  7.9*  8.0*     No results for input(s): MG, PHOS in the last 72 hours. TSH:    Lab Results   Component Value Date    TSH 3.530 09/07/2017     All Cultures (-) so far    CXR 1/12/18  Impression  A persistent right lung infiltrate. A small bibasilar pleural effusion, more on the right side. Signed by Dr Chana Bowie on 1/11/2018 4:09 PM     CXR 1/10/18  Impression  Increasing right perihilar infiltrate, which may represent worsening pneumonia or developing asymmetric pulmonary edema. The heart is enlarged. The right-sided clavian central line remains in good position. No pneumothorax is identified. Signed by Dr Staci Wellington. [place] on 1/10/2018 8:11 AM    pCXR 1/7/18  Impression  1. No pneumothorax after central line placement. 2. Interval development of opacities in the right lower lung could be due to atelectasis, pneumonia, or early pulmonary edema.    Signed by Dr Zoraida Sibley on 1/7/2018 7:03 PM     Echo 9/8/17   Summary   Normal left ventricular size with severe impairment of LV function and an  estimated ejection fraction of approximately 10-15%.   Severe global hypokinesis noted.   Mild concentric left ventricular hypertrophy noted.   No evidence of left ventricular mass or thrombus noted.   Cannot accurately assess diastolic function due to abnormal rhythm. Electronically signed by Fadumo Smith MD (Interpreting physician) on 09/11/2017 11:01 AM    CT Abdomen and Pelvis 1/6/18  Impression  1. Mildly enlarged left adrenal gland. 2. Renal cysts  3. Cardiomegaly. 4. Diverticulosis.     Signed by Dr Michela Baird on 1/6/2018 8:25 AM    CT Head 1/5/18  Impression  Changes of aging with no acute intracranial abnormality   Signed by Dr Michela Baird on 1/6/2018 7:54 AM    EEG 1/8/18  EEG INTERPRETATION:  Abnormal EEG due to diffuse slowing of the background rhythm at times appearing slightly more prominent   over the right hemisphere.  No overt epileptiform abnormalities were noted. CLINICAL CORRELATION:   This EEG is suggestive of a moderate encephalopathy, nonspecific to etiology.     Kasandra Babin DO Board Certified Neurologist Date reported: 1/8/2018 Date signed: 1/8/2018    Objective:   Vitals: /74   Pulse 74   Temp 98.9 °F (37.2 °C) (Temporal)   Resp 20   Ht 5' 2\" (1.575 m)   Wt 124 lb 12.8 oz (56.6 kg)   SpO2 94%   BMI 22.83 kg/m²   24HR INTAKE/OUTPUT:      Intake/Output Summary (Last 24 hours) at 01/13/18 1031  Last data filed at 01/13/18 0631   Gross per 24 hour   Intake              300 ml   Output             1460 ml   Net            -1160 ml     Net I/O since admission (+ 7.6 L)     General appearance: alert and cooperative with exam  HEENT: atraumatic, eyes with clear conjunctiva and normal lids, pupils and irises normal, external ears and nose are normal, lips normal. Neck without masses, lympadenopathy, bruit, thyroid normal  Lungs: no increased work of breathing, very few R basilar rales / rhonchi, no wheezes  Heart: irregular rate and rhythm, S1, S2 normal, no murmur, click, rub or gallop  Abdomen: soft, non-tender; bowel sounds normal; no masses,  no organomegaly  Extremities: extremities normal, atraumatic,

## 2018-01-13 NOTE — PROGRESS NOTES
precautions to be followed. This is felt to be the safest and least restrictive diet for the patient at this time. Patient/Family/Caregiver Education:  Attempted with patient but unable to fully assess patient understanding of precautions. Nursing was notified of continued recommendations. Compensatory Strategies: Total feed, external pacing, modified diet, remain upright after PO trials, crushed meds in puree    G-Code:  SLP G-Codes  Swallow Current Status (): At least 40 percent but less than 60 percent impaired, limited or restricted  Swallow Goal Status (): At least 40 percent but less than 60 percent impaired, limited or restricted    Plan:  Continued daily Dysphagia treatment with goals per current plan of care.     Timed Code Treatment Minutes: 20 Minutes  Total Treatment Time: [address], [facility] [employer]  Electronically signed by SHILO Galeano on 1/13/2018 at 10:12 AM

## 2020-11-03 PROBLEM — I25.10 CAD (CORONARY ARTERY DISEASE): Status: RESOLVED | Noted: 2018-01-08 | Resolved: 2020-11-03
